# Patient Record
Sex: FEMALE | Race: WHITE | NOT HISPANIC OR LATINO | Employment: UNEMPLOYED | URBAN - METROPOLITAN AREA
[De-identification: names, ages, dates, MRNs, and addresses within clinical notes are randomized per-mention and may not be internally consistent; named-entity substitution may affect disease eponyms.]

---

## 2020-01-22 PROBLEM — K21.9 GERD (GASTROESOPHAGEAL REFLUX DISEASE): Status: ACTIVE | Noted: 2020-01-22

## 2020-01-22 PROBLEM — Z98.890 H/O MAJOR ABDOMINAL SURGERY: Status: ACTIVE | Noted: 2020-01-22

## 2020-01-22 PROBLEM — R11.0 NAUSEA: Status: ACTIVE | Noted: 2020-01-22

## 2022-09-15 DIAGNOSIS — M54.2 CHRONIC NECK PAIN: Primary | ICD-10-CM

## 2022-09-15 DIAGNOSIS — G89.29 CHRONIC NECK PAIN: Primary | ICD-10-CM

## 2023-03-31 DIAGNOSIS — J38.01 PARESIS OF RIGHT VOCAL CORD: Primary | ICD-10-CM

## 2023-07-20 ENCOUNTER — OFFICE VISIT (OUTPATIENT)
Dept: OTOLARYNGOLOGY | Facility: CLINIC | Age: 53
End: 2023-07-20
Payer: MEDICAID

## 2023-07-20 VITALS
WEIGHT: 160 LBS | TEMPERATURE: 98 F | DIASTOLIC BLOOD PRESSURE: 83 MMHG | BODY MASS INDEX: 30.21 KG/M2 | HEART RATE: 72 BPM | SYSTOLIC BLOOD PRESSURE: 123 MMHG | RESPIRATION RATE: 16 BRPM | HEIGHT: 61 IN

## 2023-07-20 DIAGNOSIS — H72.91 TYMPANIC MEMBRANE PERFORATION, RIGHT: ICD-10-CM

## 2023-07-20 DIAGNOSIS — R49.0 DYSPHONIA: ICD-10-CM

## 2023-07-20 DIAGNOSIS — J38.01 PARESIS OF RIGHT VOCAL CORD: Primary | ICD-10-CM

## 2023-07-20 PROCEDURE — 31575 DIAGNOSTIC LARYNGOSCOPY: CPT | Mod: PBBFAC | Performed by: OTOLARYNGOLOGY

## 2023-07-20 PROCEDURE — 99215 OFFICE O/P EST HI 40 MIN: CPT | Mod: PBBFAC | Performed by: OTOLARYNGOLOGY

## 2023-07-20 RX ORDER — BUDESONIDE AND FORMOTEROL FUMARATE DIHYDRATE 160; 4.5 UG/1; UG/1
2 AEROSOL RESPIRATORY (INHALATION) 2 TIMES DAILY
COMMUNITY
Start: 2023-06-21

## 2023-07-20 RX ORDER — CYCLOBENZAPRINE HCL 10 MG
10 TABLET ORAL EVERY 8 HOURS PRN
COMMUNITY
Start: 2023-07-12 | End: 2024-01-31 | Stop reason: SDUPTHER

## 2023-07-20 RX ORDER — ALBUTEROL SULFATE 90 UG/1
2 AEROSOL, METERED RESPIRATORY (INHALATION) EVERY 6 HOURS PRN
COMMUNITY
Start: 2023-06-21

## 2023-07-20 RX ORDER — DICLOFENAC SODIUM 75 MG/1
75 TABLET, DELAYED RELEASE ORAL 2 TIMES DAILY PRN
COMMUNITY
Start: 2023-07-07

## 2023-07-20 NOTE — PROGRESS NOTES
Patient Name: Mayte Toney   YOB: 1970     Chief Complaint: paralyzed vocal cord       History of Present Illness:  July 20, 2023:   52-year-old female with a history of smoking referred for evaluation of right true vocal cord paralysis.  Patient is status post ACDF/PCDF surgery in May 2021 which had been complicated by the development of hoarseness.  The patient was seen by Dr. Hellen Boggs on December 2, 2021 for evaluation of this.  At that time the patient had indicated that her voice was soft or with phonatory at that time she also felt like she needed to strain to talk and had difficulty obtaining a loud vocal volume.  Rigid videostroboscopy at that time showed paralyzed right true vocal cord but a slight height mismatch.  Left vocal cord was fully mobile.  Patient was referred for speech therapy which she undertook for 6 weeks but she did not notice any improvement in her voice.  She would not seen Dr. Boggs for follow-up.    The patient was referred here after she recently had unknown of on a bronchoscopy by her pulmonologist as part of her evaluation for shortness of breath.  He noticed the right true vocal cord paralysis and referred the patient here for further treatment and evaluation.  The patient does continue to have hoarseness where she will at times need to strain to talk and continues to have difficulty obtaining a loud vocal volume.  She does not have any coughing or choking with eating or swallowing.  In regards to her shortness of breath she notices this more when lying down at night.  Patient denies history of heartburn or indigestion.  She currently smokes 1/2 pack of cigarettes per day.  She had been a heavier smoker in the past.  She does continue to have neck and back problems and may need additional surgery.    Patient had also indicated that she does have a right tympanic membrane perforation which she sustained at the age of 3 when her brother had placed a Chalo pin  in her ear.  She had undergone what sounds to be a tympanoplasty with a skin graft.  Patient has noticed that the hearing in her right ear is diminished relative to the left.  She will experience periodic drainage from that ear he is not had any problems for the past several months.  She does try to keep the ear dry.  Left ear is without problems.  No vertigo.  She has not had any recent audiologic evaluations.    Past Medical History:  Past Medical History:   Diagnosis Date    Allergies     Anxiety     Arthritis     Bowel obstruction     Depression     Hypertension      Past Surgical History:   Procedure Laterality Date    ABDOMINAL SURGERY      adnoids      APPENDECTOMY       SECTION      GALLBLADDER SURGERY      HERNIA REPAIR      HYSTERECTOMY      SKIN GRAFT Right     ear    SPINE SURGERY      TONSILLECTOMY         Review of Systems:  Unremarkable except as mentioned above.    Current Medications:  Current Outpatient Medications   Medication Sig    albuterol (PROVENTIL/VENTOLIN HFA) 90 mcg/actuation inhaler 2 puffs every 6 (six) hours as needed.    ARIPiprazole (ABILIFY) 10 MG Tab Take 10 mg by mouth once daily.    baclofen (LIORESAL) 20 MG tablet Take 20 mg by mouth 3 (three) times daily.    clonazePAM (KLONOPIN) 1 MG tablet Take 1 mg by mouth 3 (three) times daily.    cyclobenzaprine (FLEXERIL) 10 MG tablet Take 10 mg by mouth every 8 (eight) hours as needed.    dextroamphetamine-amphetamine (ADDERALL XR) 30 MG 24 hr capsule Take 30 mg by mouth 2 (two) times daily.    diclofenac (VOLTAREN) 75 MG EC tablet Take 75 mg by mouth 2 (two) times daily as needed.    docusate sodium (COLACE) 100 MG capsule Take 100 mg by mouth 2 (two) times daily.    estrogens, conjugated, (PREMARIN) 0.625 MG tablet Take 0.625 mg by mouth once daily.    gabapentin (NEURONTIN) 300 MG capsule Take 300 mg by mouth every 8 (eight) hours.    levETIRAcetam (KEPPRA) 500 MG Tab Take 500 mg by mouth 2 (two) times daily.     "levocetirizine (XYZAL) 5 MG tablet Take 5 mg by mouth every evening.    promethazine (PHENERGAN) 25 MG tablet Take 25 mg by mouth every 6 (six) hours as needed for Nausea.    SYMBICORT 160-4.5 mcg/actuation HFAA 2 puffs 2 (two) times daily.    venlafaxine (EFFEXOR) 75 MG tablet Take 75 mg by mouth 2 (two) times daily.    naproxen (NAPROSYN) 500 MG tablet Take 500 mg by mouth 2 (two) times daily.    OLANZapine (ZYPREXA) 5 MG tablet Take 5 mg by mouth every evening.    pantoprazole (PROTONIX) 20 MG tablet Take 1 tablet (20 mg total) by mouth once daily.    polyethylene glycol (GLYCOLAX) 17 gram/dose powder Take 17 g by mouth once daily. (Patient not taking: Reported on 7/20/2023)     No current facility-administered medications for this visit.        Allergies:  Review of patient's allergies indicates:   Allergen Reactions    Depakene [valproic acid]     Toradol [ketorolac]     Tramadol     Zofran [ondansetron hcl]     Lyrica [pregabalin] Nausea And Vomiting     Bad headaches        Physical Exam:  Vital signs:   Vitals:    07/20/23 0826   BP: 123/83   BP Location: Right arm   Patient Position: Sitting   BP Method: Medium (Automatic)   Pulse: 72   Resp: 16   Temp: 97.8 °F (36.6 °C)   TempSrc: Oral   Weight: 72.6 kg (160 lb)   Height: 5' 1" (1.549 m)   General:  Well-developed well-nourished female in no acute distress.  Voice is slightly hoarse and she will have periodic pitch breaks.  No stridor.    Head and face:  Normocephalic.  No facial lesions.  No temporomandibular joint tenderness or click.  Ears: Right ear-auricle is normally developed.  External auditory canal is clear.  Tympanic membrane is nonerythematous.  She does have a 25% marginal perforation involving the junction of the anterior inferior and posterior inferior quadrant of the pars tensa.  No squamous debris.  No granulation.  No mucosal edema or drainage. Left ear-auricle is normally developed.  External auditory canal is clear.  Tympanic membrane " is nonerythematous.   Nose:  Nasal dorsum unremarkable.  Mild congestion with clear drainage.    Neck:  Supple without adenopathy or thyromegaly.  Trachea is in the midline.  Parotid and submandibular glands are normal to palpation without masses or tenderness.  Eyes:  Extraocular muscles intact.  No nystagmus.  No exophthalmos or enophthalmos.  Neurologic:  Alert and oriented.  Cranial nerves 2-12 are grossly normal.    Procedure note: Flexible laryngoscopy.  The nose was prepped with 1% phenyleprine nasal spray and tetracaine topically. The flexible fiberoptic laryngoscope was introduced into the left nostril and advanced. Examination of the nose showed the above mentioned findings. Examination of the nasopharynx showed no nasopharyngeal masses or eustachian tube obstruction. Examination of the base of tongue showed mild lingual tonsil hypertrophy.  No masses or exudate involving the base of tongue. Laryngeal examination showed right true vocal cord is immobile and in a midline position.  Left true vocal cord has normal mobility.  There is near complete glottic closure upon phonation.  She does have a slight posterior glottic chink.  No other laryngeal lesions or masses. Examination of the hypopharynx showed no masses or lesions.  No pooling of secretions in the piriform sinuses.  She does have some mild posterior arytenoid edema.    Assessment/Plan:  51-year-old female with right true vocal cord paresis initially diagnosed on December 2, 2021, in a patient status post ACDF/PCD F surgery in May of 2021.  Patient continues to have some problem with hoarseness and vocal difficulties.  By history she does not have any associated dysphagia.  She had been treated with speech therapy previously.    Right tympanic membrane perforation.      Plan:  We will refer the patient back to Dr. Hellen Boggs for further evaluation for additional treatment of her right vocal cord paralysis.  I did discuss with her possibility of a  medialization procedure and that sometimes a temporary procedure may be done initially to see if she would experience benefit.  She does understand this.    In regards to her tympanic membrane perforation.  She will be scheduled for an audiogram with follow-up afterwards.  She is to keep the ear dry.      Shashi Montano M.D.

## 2023-10-18 ENCOUNTER — CLINICAL SUPPORT (OUTPATIENT)
Dept: AUDIOLOGY | Facility: HOSPITAL | Age: 53
End: 2023-10-18
Payer: MEDICAID

## 2023-10-18 DIAGNOSIS — H90.A31 MIXED CONDUCTIVE AND SENSORINEURAL HEARING LOSS OF RIGHT EAR WITH RESTRICTED HEARING OF LEFT EAR: Primary | ICD-10-CM

## 2023-10-18 PROCEDURE — 92567 TYMPANOMETRY: CPT | Performed by: AUDIOLOGIST-HEARING AID FITTER

## 2023-10-18 PROCEDURE — 92557 COMPREHENSIVE HEARING TEST: CPT | Performed by: AUDIOLOGIST-HEARING AID FITTER

## 2023-10-18 NOTE — PROGRESS NOTES
Audiological Evaluation    Patient History:    Patient evaluated today to assess hearing.  She reportedly perceives significant difficulties with hearing of the right ear only due to a history of tympanic membrane perforation in childhood and otologic procedure (T-plasty).  Tinnitus, otalgia, otorrhea and dizziness have been denied at this time.  Patient's medical history has reportedly not changed since most recent medical evaluation.       Pure Tone Testing:    Right ear:       Moderately-severe, mixed HL     Left ear:          Normal to moderate, SNHL        Tympanometry:      Right ear:    CNT due to presence of TM perforation    Left ear: Type 'A' tympanogram           Acoustic Reflex Testing    Right ear:   Did not test     Left ear: Did not test        Interpretations:    Pure tone testing revealed a moderately-severe, mixed hearing loss for the right ear.  Testing for the left ear revealed normal to moderate, sensorineural hearing loss. Speech reception thresholds were obtained at 55 dB HL (right ear) and 20 dB HL (left ear) consistent with pure tone results, bilaterally.  Word recognition scores were excellent, bilaterally.  Immittance testing revealed a Type A tympanogram for the left ear indicative of normal middle ear function; a seal could not be maintained for the left ear due to the presence of the perforation. Otoscopy revealed clear EACs, bilaterally.      Recommendations:     Audiological testing annually and/or per ENT  ENT evaluation per ENT (11/14/2023)  Hearing protection when exposed to hazardous noise    June Pappas  Clinical Audiologist

## 2023-11-14 ENCOUNTER — OFFICE VISIT (OUTPATIENT)
Dept: OTOLARYNGOLOGY | Facility: CLINIC | Age: 53
End: 2023-11-14
Payer: MEDICAID

## 2023-11-14 VITALS
HEIGHT: 61 IN | BODY MASS INDEX: 25.11 KG/M2 | DIASTOLIC BLOOD PRESSURE: 89 MMHG | HEART RATE: 79 BPM | TEMPERATURE: 98 F | SYSTOLIC BLOOD PRESSURE: 137 MMHG | RESPIRATION RATE: 16 BRPM | WEIGHT: 133 LBS

## 2023-11-14 DIAGNOSIS — H90.A22 SENSORINEURAL HEARING LOSS (SNHL) OF LEFT EAR WITH RESTRICTED HEARING OF RIGHT EAR: ICD-10-CM

## 2023-11-14 DIAGNOSIS — H72.91 TYMPANIC MEMBRANE PERFORATION, RIGHT: Primary | ICD-10-CM

## 2023-11-14 DIAGNOSIS — H90.A31 MIXED CONDUCTIVE AND SENSORINEURAL HEARING LOSS OF RIGHT EAR WITH RESTRICTED HEARING OF LEFT EAR: ICD-10-CM

## 2023-11-14 PROBLEM — K56.609 BOWEL OBSTRUCTION: Status: ACTIVE | Noted: 2023-11-14

## 2023-11-14 PROCEDURE — 99215 OFFICE O/P EST HI 40 MIN: CPT | Mod: PBBFAC | Performed by: OTOLARYNGOLOGY

## 2023-11-14 RX ORDER — CEFAZOLIN SODIUM 2 G/50ML
2 SOLUTION INTRAVENOUS
Status: CANCELLED | OUTPATIENT
Start: 2023-11-14

## 2023-11-14 NOTE — H&P (VIEW-ONLY)
Patient Name: Mayte Toney   YOB: 1970     Chief Complaint: paralyzed vocal cord       History of Present Illness:  July 20, 2023:   52-year-old female with a history of smoking referred for evaluation of right true vocal cord paralysis.  Patient is status post ACDF/PCDF surgery in May 2021 which had been complicated by the development of hoarseness.  The patient was seen by Dr. Hellen Boggs on December 2, 2021 for evaluation of this.  At that time the patient had indicated that her voice was soft or with phonatory at that time she also felt like she needed to strain to talk and had difficulty obtaining a loud vocal volume.  Rigid videostroboscopy at that time showed paralyzed right true vocal cord but a slight height mismatch.  Left vocal cord was fully mobile.  Patient was referred for speech therapy which she undertook for 6 weeks but she did not notice any improvement in her voice.  She would not seen Dr. Boggs for follow-up.    The patient was referred here after she recently had unknown of on a bronchoscopy by her pulmonologist as part of her evaluation for shortness of breath.  He noticed the right true vocal cord paralysis and referred the patient here for further treatment and evaluation.  The patient does continue to have hoarseness where she will at times need to strain to talk and continues to have difficulty obtaining a loud vocal volume.  She does not have any coughing or choking with eating or swallowing.  In regards to her shortness of breath she notices this more when lying down at night.  Patient denies history of heartburn or indigestion.  She currently smokes 1/2 pack of cigarettes per day.  She had been a heavier smoker in the past.  She does continue to have neck and back problems and may need additional surgery.    Patient had also indicated that she does have a right tympanic membrane perforation which she sustained at the age of 3 when her brother had placed a Chalo pin  in her ear.  She had undergone what sounds to be a tympanoplasty with a skin graft.  Patient has noticed that the hearing in her right ear is diminished relative to the left.  She will experience periodic drainage from that ear he is not had any problems for the past several months.  She does try to keep the ear dry.  Left ear is without problems.  No vertigo.  She has not had any recent audiologic evaluations.    November 14, 2023:   Patient presents today for follow-up of her right tympanic membrane perforation in review of her audiogram done on October 18, 2023.  Patient has not had any problems with any pain or drainage since her last appointment.  She is keeping water out of the ear.  The audiogram showed a moderate least severe mixed hearing loss in the right ear with an air bone gap of 20 to 50 decibel air bone gap with a speech reception threshold of 55 decibels and 100% discrimination.  In the left ear she had a normal to moderate sensorineural hearing loss with speech reception threshold of 20 decibels and 100% discrimination and a type a tympanogram.  Results were discussed with the patient.  In regards to her paralysis the patient had seen Dr. Pérez in the patient did require an urgent tracheostomy on a August 25, 2023, when she was found to have a new left true vocal cord paresis and subglottic mucus plug resulting in respiratory distress and altered mental status necessitating eventual intubation.  Patient does have follow-up with Dr. Pérez is coming spring.  Patient is tolerating regular diet at this time.  Of note the patient does continue to smoke but this is not on a daily basis.    Past Medical History:  Past Medical History:   Diagnosis Date    Allergies     Anxiety     Arthritis     Bowel obstruction     Status post exploratory laparotomy with partial colectomy for treatment; no cancer was present.  No colostomy was needed.    Depression     Hypertension      Past Surgical History:    Procedure Laterality Date    ABDOMINAL SURGERY      Status post exploratory laparotomy with partial colon resection for bowel obstruction; no cancer present and no colostomy needed.    APPENDECTOMY       SECTION      GALLBLADDER SURGERY      HIATAL HERNIA REPAIR      HYSTERECTOMY      SPINE SURGERY      TONSILLECTOMY AND ADENOIDECTOMY      TRACHEOSTOMY  2023    TYMPANOPLASTY         Review of Systems:  Unremarkable except as mentioned above.    Current Medications:  Current Outpatient Medications   Medication Sig    albuterol (PROVENTIL/VENTOLIN HFA) 90 mcg/actuation inhaler 2 puffs every 6 (six) hours as needed.    ARIPiprazole (ABILIFY) 10 MG Tab Take 10 mg by mouth once daily.    baclofen (LIORESAL) 20 MG tablet Take 20 mg by mouth 3 (three) times daily.    clonazePAM (KLONOPIN) 1 MG tablet Take 1 mg by mouth 3 (three) times daily.    cyclobenzaprine (FLEXERIL) 10 MG tablet Take 10 mg by mouth every 8 (eight) hours as needed.    dextroamphetamine-amphetamine (ADDERALL XR) 30 MG 24 hr capsule Take 30 mg by mouth 2 (two) times daily.    diclofenac (VOLTAREN) 75 MG EC tablet Take 75 mg by mouth 2 (two) times daily as needed.    gabapentin (NEURONTIN) 300 MG capsule Take 300 mg by mouth every 8 (eight) hours.    levocetirizine (XYZAL) 5 MG tablet Take 5 mg by mouth every evening.    naproxen (NAPROSYN) 500 MG tablet Take 500 mg by mouth 2 (two) times daily.    OLANZapine (ZYPREXA) 5 MG tablet Take 5 mg by mouth every evening.    SYMBICORT 160-4.5 mcg/actuation HFAA 2 puffs 2 (two) times daily.    venlafaxine (EFFEXOR) 75 MG tablet Take 75 mg by mouth 2 (two) times daily.    docusate sodium (COLACE) 100 MG capsule Take 100 mg by mouth 2 (two) times daily.    estrogens, conjugated, (PREMARIN) 0.625 MG tablet Take 0.625 mg by mouth once daily.    levETIRAcetam (KEPPRA) 500 MG Tab Take 500 mg by mouth 2 (two) times daily.    pantoprazole (PROTONIX) 20 MG tablet Take 1 tablet (20 mg total) by mouth once  "daily.    polyethylene glycol (GLYCOLAX) 17 gram/dose powder Take 17 g by mouth once daily. (Patient not taking: Reported on 7/20/2023)    promethazine (PHENERGAN) 25 MG tablet Take 25 mg by mouth every 6 (six) hours as needed for Nausea.     No current facility-administered medications for this visit.        Allergies:  Review of patient's allergies indicates:   Allergen Reactions    Depakene [valproic acid]     Toradol [ketorolac]     Tramadol     Zofran [ondansetron hcl]     Lyrica [pregabalin] Nausea And Vomiting     Bad headaches        Physical Exam:  Vital signs:   Vitals:    11/14/23 0835   BP: 137/89   BP Location: Right arm   Patient Position: Sitting   BP Method: Medium (Automatic)   Pulse: 79   Resp: 16   Temp: 98.4 °F (36.9 °C)   TempSrc: Oral   Weight: 60.3 kg (133 lb)   Height: 5' 1" (1.549 m)   General:  Well-developed well-nourished female in no acute distress.  Voice is slightly hoarse and she will have periodic pitch breaks.  No stridor.    Head and face:  Normocephalic.  No facial lesions.  No temporomandibular joint tenderness or click.  Ears: Right ear-auricle is normally developed.  External auditory canal is clear.  Tympanic membrane is nonerythematous.  She does have a 30-40% marginal perforation involving the junction of the anterior inferior and posterior inferior quadrant of the pars tensa.  No squamous debris.  No granulation.  No mucosal edema or drainage. Left ear-auricle is normally developed.  External auditory canal is clear.  Tympanic membrane is nonerythematous.   Nose:  Nasal dorsum unremarkable.  Mild congestion with clear drainage.    Neck:  Supple without adenopathy or thyromegaly.  Trachea is in the midline.  Parotid and submandibular glands are normal to palpation without masses or tenderness.  Eyes:  Extraocular muscles intact.  No nystagmus.  No exophthalmos or enophthalmos.  Neurologic:  Alert and oriented.  Cranial nerves 2-12 are grossly " normal.    Audiogram:        Assessment/Plan:  53-year-old female with longstanding right tympanic membrane perforation associated with a mixed hearing loss with a 20-50 decibel air bone gap without history of recurrent otorrhea.    Plan:  Treatment options for the tympanic membrane perforation were discussed.  This includes no intervention with continued water precautions versus no surgical intervention with hearing aid for hearing rehabilitation versus tympanoplasty with the aim of closing the tympanic membrane perforation and improving her hearing.  Patient would like to proceed with surgery and this is scheduled for November 20th 2023.  Will attempt to obtain a CT scan preoperatively but spoke with the patient and told her that if this could not be done that would not preclude her from having surgery.  Risks of the procedure including bleeding, infection, graft failure, persistence of her hearing loss or possible worsening, tinnitus, dizziness, facial weakness, and altered taste were also discussed and she does understand the risk.      Shashi Montano M.D.

## 2023-11-19 ENCOUNTER — ANESTHESIA EVENT (OUTPATIENT)
Dept: SURGERY | Facility: HOSPITAL | Age: 53
End: 2023-11-19
Payer: MEDICAID

## 2023-11-20 ENCOUNTER — HOSPITAL ENCOUNTER (OUTPATIENT)
Facility: HOSPITAL | Age: 53
Discharge: HOME | End: 2023-11-20
Attending: OTOLARYNGOLOGY | Admitting: OTOLARYNGOLOGY
Payer: MEDICAID

## 2023-11-20 ENCOUNTER — ANESTHESIA (OUTPATIENT)
Dept: SURGERY | Facility: HOSPITAL | Age: 53
End: 2023-11-20
Payer: MEDICAID

## 2023-11-20 VITALS
HEART RATE: 71 BPM | RESPIRATION RATE: 20 BRPM | OXYGEN SATURATION: 98 % | WEIGHT: 127.63 LBS | BODY MASS INDEX: 24.1 KG/M2 | HEIGHT: 61 IN | DIASTOLIC BLOOD PRESSURE: 90 MMHG | TEMPERATURE: 98 F | SYSTOLIC BLOOD PRESSURE: 126 MMHG

## 2023-11-20 DIAGNOSIS — Z01.818 PRE-OP EVALUATION: ICD-10-CM

## 2023-11-20 DIAGNOSIS — H90.A31 MIXED CONDUCTIVE AND SENSORINEURAL HEARING LOSS OF RIGHT EAR WITH RESTRICTED HEARING OF LEFT EAR: Primary | ICD-10-CM

## 2023-11-20 DIAGNOSIS — H72.91 TYMPANIC MEMBRANE PERFORATION, RIGHT: ICD-10-CM

## 2023-11-20 LAB
BASOPHILS # BLD AUTO: 0.07 X10(3)/MCL
BASOPHILS NFR BLD AUTO: 0.6 %
EOSINOPHIL # BLD AUTO: 0.5 X10(3)/MCL (ref 0–0.9)
EOSINOPHIL NFR BLD AUTO: 4.6 %
ERYTHROCYTE [DISTWIDTH] IN BLOOD BY AUTOMATED COUNT: 13.2 % (ref 11.5–17)
HCT VFR BLD AUTO: 39.7 % (ref 37–47)
HGB BLD-MCNC: 12.3 G/DL (ref 12–16)
IMM GRANULOCYTES # BLD AUTO: 0.06 X10(3)/MCL (ref 0–0.04)
IMM GRANULOCYTES NFR BLD AUTO: 0.6 %
LYMPHOCYTES # BLD AUTO: 4.24 X10(3)/MCL (ref 0.6–4.6)
LYMPHOCYTES NFR BLD AUTO: 39.2 %
MCH RBC QN AUTO: 27.8 PG (ref 27–31)
MCHC RBC AUTO-ENTMCNC: 31 G/DL (ref 33–36)
MCV RBC AUTO: 89.8 FL (ref 80–94)
MONOCYTES # BLD AUTO: 0.87 X10(3)/MCL (ref 0.1–1.3)
MONOCYTES NFR BLD AUTO: 8 %
NEUTROPHILS # BLD AUTO: 5.07 X10(3)/MCL (ref 2.1–9.2)
NEUTROPHILS NFR BLD AUTO: 47 %
NRBC BLD AUTO-RTO: 0 %
PLATELET # BLD AUTO: 357 X10(3)/MCL (ref 130–400)
PMV BLD AUTO: 8.8 FL (ref 7.4–10.4)
RBC # BLD AUTO: 4.42 X10(6)/MCL (ref 4.2–5.4)
WBC # SPEC AUTO: 10.81 X10(3)/MCL (ref 4.5–11.5)

## 2023-11-20 PROCEDURE — 63600175 PHARM REV CODE 636 W HCPCS: Performed by: ANESTHESIOLOGY

## 2023-11-20 PROCEDURE — 88304 TISSUE EXAM BY PATHOLOGIST: CPT | Mod: TC | Performed by: OTOLARYNGOLOGY

## 2023-11-20 PROCEDURE — 25000003 PHARM REV CODE 250

## 2023-11-20 PROCEDURE — D9220A PRA ANESTHESIA: Mod: ANES,,, | Performed by: ANESTHESIOLOGY

## 2023-11-20 PROCEDURE — D9220A PRA ANESTHESIA: ICD-10-PCS | Mod: CRNA,,, | Performed by: NURSE ANESTHETIST, CERTIFIED REGISTERED

## 2023-11-20 PROCEDURE — D9220A PRA ANESTHESIA: ICD-10-PCS | Mod: ANES,,, | Performed by: ANESTHESIOLOGY

## 2023-11-20 PROCEDURE — 25000003 PHARM REV CODE 250: Performed by: OTOLARYNGOLOGY

## 2023-11-20 PROCEDURE — 63600175 PHARM REV CODE 636 W HCPCS: Performed by: NURSE ANESTHETIST, CERTIFIED REGISTERED

## 2023-11-20 PROCEDURE — 85025 COMPLETE CBC W/AUTO DIFF WBC: CPT | Performed by: OTOLARYNGOLOGY

## 2023-11-20 PROCEDURE — 36000709 HC OR TIME LEV III EA ADD 15 MIN: Performed by: OTOLARYNGOLOGY

## 2023-11-20 PROCEDURE — 71000015 HC POSTOP RECOV 1ST HR: Performed by: OTOLARYNGOLOGY

## 2023-11-20 PROCEDURE — 36000708 HC OR TIME LEV III 1ST 15 MIN: Performed by: OTOLARYNGOLOGY

## 2023-11-20 PROCEDURE — 37000008 HC ANESTHESIA 1ST 15 MINUTES: Performed by: OTOLARYNGOLOGY

## 2023-11-20 PROCEDURE — 25000003 PHARM REV CODE 250: Performed by: NURSE ANESTHETIST, CERTIFIED REGISTERED

## 2023-11-20 PROCEDURE — 27201423 OPTIME MED/SURG SUP & DEVICES STERILE SUPPLY: Performed by: OTOLARYNGOLOGY

## 2023-11-20 PROCEDURE — 71000016 HC POSTOP RECOV ADDL HR: Performed by: OTOLARYNGOLOGY

## 2023-11-20 PROCEDURE — 93005 ELECTROCARDIOGRAM TRACING: CPT | Mod: 59

## 2023-11-20 PROCEDURE — 71000033 HC RECOVERY, INTIAL HOUR: Performed by: OTOLARYNGOLOGY

## 2023-11-20 PROCEDURE — 63600175 PHARM REV CODE 636 W HCPCS: Performed by: OTOLARYNGOLOGY

## 2023-11-20 PROCEDURE — D9220A PRA ANESTHESIA: Mod: CRNA,,, | Performed by: NURSE ANESTHETIST, CERTIFIED REGISTERED

## 2023-11-20 PROCEDURE — 37000009 HC ANESTHESIA EA ADD 15 MINS: Performed by: OTOLARYNGOLOGY

## 2023-11-20 RX ORDER — MORPHINE SULFATE 2 MG/ML
2 INJECTION, SOLUTION INTRAMUSCULAR; INTRAVENOUS EVERY 5 MIN PRN
Status: DISCONTINUED | OUTPATIENT
Start: 2023-11-20 | End: 2023-11-20 | Stop reason: HOSPADM

## 2023-11-20 RX ORDER — OFLOXACIN 3 MG/ML
SOLUTION/ DROPS OPHTHALMIC
Status: DISCONTINUED | OUTPATIENT
Start: 2023-11-20 | End: 2023-11-20 | Stop reason: HOSPADM

## 2023-11-20 RX ORDER — MIDAZOLAM HYDROCHLORIDE 1 MG/ML
2 INJECTION INTRAMUSCULAR; INTRAVENOUS ONCE AS NEEDED
Status: COMPLETED | OUTPATIENT
Start: 2023-11-20 | End: 2023-11-20

## 2023-11-20 RX ORDER — LIDOCAINE HYDROCHLORIDE 20 MG/ML
INJECTION INTRAVENOUS
Status: DISCONTINUED | OUTPATIENT
Start: 2023-11-20 | End: 2023-11-20

## 2023-11-20 RX ORDER — MUPIROCIN 20 MG/G
OINTMENT TOPICAL
Status: DISCONTINUED | OUTPATIENT
Start: 2023-11-20 | End: 2023-11-20 | Stop reason: HOSPADM

## 2023-11-20 RX ORDER — PROPOFOL 10 MG/ML
VIAL (ML) INTRAVENOUS
Status: DISCONTINUED | OUTPATIENT
Start: 2023-11-20 | End: 2023-11-20

## 2023-11-20 RX ORDER — MEPERIDINE HYDROCHLORIDE 25 MG/ML
12.5 INJECTION INTRAMUSCULAR; INTRAVENOUS; SUBCUTANEOUS EVERY 10 MIN PRN
Status: DISCONTINUED | OUTPATIENT
Start: 2023-11-20 | End: 2023-11-20 | Stop reason: HOSPADM

## 2023-11-20 RX ORDER — DEXAMETHASONE SODIUM PHOSPHATE 4 MG/ML
INJECTION, SOLUTION INTRA-ARTICULAR; INTRALESIONAL; INTRAMUSCULAR; INTRAVENOUS; SOFT TISSUE
Status: DISCONTINUED | OUTPATIENT
Start: 2023-11-20 | End: 2023-11-20

## 2023-11-20 RX ORDER — REMIFENTANIL HYDROCHLORIDE 1 MG/ML
INJECTION, POWDER, LYOPHILIZED, FOR SOLUTION INTRAVENOUS CONTINUOUS PRN
Status: DISCONTINUED | OUTPATIENT
Start: 2023-11-20 | End: 2023-11-20

## 2023-11-20 RX ORDER — SODIUM CHLORIDE, SODIUM LACTATE, POTASSIUM CHLORIDE, CALCIUM CHLORIDE 600; 310; 30; 20 MG/100ML; MG/100ML; MG/100ML; MG/100ML
INJECTION, SOLUTION INTRAVENOUS CONTINUOUS
Status: DISCONTINUED | OUTPATIENT
Start: 2023-11-20 | End: 2023-11-20 | Stop reason: HOSPADM

## 2023-11-20 RX ORDER — DROPERIDOL 2.5 MG/ML
INJECTION, SOLUTION INTRAMUSCULAR; INTRAVENOUS
Status: DISCONTINUED | OUTPATIENT
Start: 2023-11-20 | End: 2023-11-20

## 2023-11-20 RX ORDER — DEXMEDETOMIDINE HYDROCHLORIDE 100 UG/ML
INJECTION, SOLUTION INTRAVENOUS
Status: DISCONTINUED | OUTPATIENT
Start: 2023-11-20 | End: 2023-11-20

## 2023-11-20 RX ORDER — EPINEPHRINE 1 MG/ML
INJECTION INTRAMUSCULAR; INTRAVENOUS; SUBCUTANEOUS
Status: DISCONTINUED | OUTPATIENT
Start: 2023-11-20 | End: 2023-11-20 | Stop reason: HOSPADM

## 2023-11-20 RX ORDER — EPINEPHRINE 1 MG/ML
INJECTION, SOLUTION, CONCENTRATE INTRAVENOUS
Status: DISCONTINUED | OUTPATIENT
Start: 2023-11-20 | End: 2023-11-20 | Stop reason: HOSPADM

## 2023-11-20 RX ORDER — FENTANYL CITRATE 50 UG/ML
INJECTION, SOLUTION INTRAMUSCULAR; INTRAVENOUS
Status: DISCONTINUED | OUTPATIENT
Start: 2023-11-20 | End: 2023-11-20

## 2023-11-20 RX ORDER — CEFAZOLIN SODIUM 1 G/3ML
2 INJECTION, POWDER, FOR SOLUTION INTRAMUSCULAR; INTRAVENOUS
Status: COMPLETED | OUTPATIENT
Start: 2023-11-20 | End: 2023-11-20

## 2023-11-20 RX ORDER — SODIUM CHLORIDE 0.9 % (FLUSH) 0.9 %
10 SYRINGE (ML) INJECTION
Status: DISCONTINUED | OUTPATIENT
Start: 2023-11-20 | End: 2023-11-20 | Stop reason: HOSPADM

## 2023-11-20 RX ORDER — HYDROCODONE BITARTRATE AND ACETAMINOPHEN 5; 325 MG/1; MG/1
1 TABLET ORAL EVERY 6 HOURS PRN
Qty: 12 TABLET | Refills: 0 | Status: SHIPPED | OUTPATIENT
Start: 2023-11-20 | End: 2023-11-27

## 2023-11-20 RX ORDER — LIDOCAINE HYDROCHLORIDE 10 MG/ML
INJECTION INFILTRATION; PERINEURAL
Status: DISCONTINUED | OUTPATIENT
Start: 2023-11-20 | End: 2023-11-20 | Stop reason: HOSPADM

## 2023-11-20 RX ORDER — OXYCODONE HYDROCHLORIDE 5 MG/1
5 TABLET ORAL
Status: DISCONTINUED | OUTPATIENT
Start: 2023-11-20 | End: 2023-11-20 | Stop reason: HOSPADM

## 2023-11-20 RX ADMIN — SODIUM CHLORIDE, POTASSIUM CHLORIDE, SODIUM LACTATE AND CALCIUM CHLORIDE: 600; 310; 30; 20 INJECTION, SOLUTION INTRAVENOUS at 06:11

## 2023-11-20 RX ADMIN — MIDAZOLAM HYDROCHLORIDE 2 MG: 1 INJECTION, SOLUTION INTRAMUSCULAR; INTRAVENOUS at 06:11

## 2023-11-20 RX ADMIN — DEXMEDETOMIDINE 20 MCG: 200 INJECTION, SOLUTION INTRAVENOUS at 09:11

## 2023-11-20 RX ADMIN — PROPOFOL 150 MG: 10 INJECTION, EMULSION INTRAVENOUS at 07:11

## 2023-11-20 RX ADMIN — LIDOCAINE HYDROCHLORIDE 50 MG: 20 INJECTION INTRAVENOUS at 07:11

## 2023-11-20 RX ADMIN — DROPERIDOL 0.62 MG: 2.5 INJECTION, SOLUTION INTRAMUSCULAR; INTRAVENOUS at 07:11

## 2023-11-20 RX ADMIN — REMIFENTANIL HYDROCHLORIDE 0.05 MCG/KG/MIN: 1 INJECTION, POWDER, LYOPHILIZED, FOR SOLUTION INTRAVENOUS at 07:11

## 2023-11-20 RX ADMIN — FENTANYL CITRATE 50 MCG: 50 INJECTION INTRAMUSCULAR; INTRAVENOUS at 09:11

## 2023-11-20 RX ADMIN — CEFAZOLIN 2 G: 330 INJECTION, POWDER, FOR SOLUTION INTRAMUSCULAR; INTRAVENOUS at 07:11

## 2023-11-20 RX ADMIN — DEXAMETHASONE SODIUM PHOSPHATE 8 MG: 4 INJECTION, SOLUTION INTRA-ARTICULAR; INTRALESIONAL; INTRAMUSCULAR; INTRAVENOUS; SOFT TISSUE at 07:11

## 2023-11-20 RX ADMIN — FENTANYL CITRATE 50 MCG: 50 INJECTION INTRAMUSCULAR; INTRAVENOUS at 07:11

## 2023-11-20 NOTE — DISCHARGE INSTRUCTIONS
"POST OPERATIVE INSTRUCTIONS:     Activity  No heavy lifting, strenuous activity, contact sports for 2 weeks. Refrain from bending forward to reach something on the floor  DO NOT blow your nose until such time as your doctor has indicated your ear is healed. Any accumulated secretions in the nose may be drawn back into the throat and cough/spit up. You may use saline spray in the nose if needed  DO NOT "pop" your ears by holding your nose and blowing air through the Eustachian tube into the ear  Sneeze with your mouth open    Keep left ear dry at all times. When showering, use cotton ball with vaseline on it to keep the ear dry    Medication  Take tylenol 650mg q6 hours as needed for pain/fever and ibuprofen 600mg q6 hours as needed for pain/fever.    Hearing  Rarely is hearing improvement noted immediately after surgery. It may even worsen temporarily due to swelling and packing in the ear. Maximum improvement takes 4-6 months.  Occasional popping sensation, fullness, or even sharp pains can be expected for several weeks after surgery  Minor degrees of dizziness may be present on head motion and need not concern you unless this should increase. Avoid sudden movements.   "

## 2023-11-20 NOTE — TRANSFER OF CARE
"Anesthesia Transfer of Care Note    Patient: Mayte Toney    Procedure(s) Performed: Procedure(s) (LRB):  TYMPANOPLASTY (Right)    Patient location: PACU    Anesthesia Type: general    Transport from OR: Transported from OR on room air with adequate spontaneous ventilation    Post pain: adequate analgesia    Post assessment: no apparent anesthetic complications and tolerated procedure well    Post vital signs: stable    Level of consciousness: awake    Nausea/Vomiting: no nausea/vomiting    Complications: none    Transfer of care protocol was followedComments: Report to Daily RN      Last vitals: Visit Vitals  BP (!) 131/96   Pulse 80   Temp 36.3 °C (97.4 °F) (Axillary)   Resp 20   Ht 5' 0.98" (1.549 m)   Wt 57.9 kg (127 lb 9.6 oz)   SpO2 100%   Breastfeeding No   BMI 24.12 kg/m²     "

## 2023-11-20 NOTE — ANESTHESIA PROCEDURE NOTES
Intubation    Date/Time: 11/20/2023 7:11 AM    Performed by: Ruperto Henderson CRNA  Authorized by: Ashli Wiley MD    Intubation:     Induction:  Inhalational - ETT/trach    Intubated:  Arrived to OR intubated    Mask Ventilation:  Not attempted    Attempts:  1    Attempted By:  CRNA    Difficult Airway Encountered?: No      Complications:  None    Airway Device:  Other (see comments) and coil wire tube    Airway Device Size:  7.0    Style/Cuff Inflation:  Cuffed (inflated to minimal occlusive pressure)    Inflation Amount (mL):  8    Secured at:  The skin level of trach    Placement Verified By:  Capnometry    Complicating Factors:  None    Findings Post-Intubation:  BS equal bilateral  Notes:      Arrived in OR with existing Insitu Tracheostomy

## 2023-11-20 NOTE — ANESTHESIA POSTPROCEDURE EVALUATION
Anesthesia Post Evaluation    Patient: Mayte Toney    Procedure(s) Performed: Procedure(s) (LRB):  TYMPANOPLASTY (Right)    Final Anesthesia Type: general      Patient location during evaluation: DOSC  Post-procedure vital signs: reviewed and stable  Airway patency: patent      Anesthetic complications: no      Cardiovascular status: hemodynamically stable  Respiratory status: spontaneous ventilation  Follow-up not needed.          Vitals Value Taken Time   /90 11/20/23 1246   Temp 36.3 °C (97.4 °F) 11/20/23 0943   Pulse 80 11/20/23 1246   Resp 20 11/20/23 1000   SpO2 98 % 11/20/23 1246   Vitals shown include unvalidated device data.      Event Time   Out of Recovery 10:12:00         Pain/Kade Score: Kade Score: 10 (11/20/2023 10:06 AM)

## 2023-11-20 NOTE — OP NOTE
Otolaryngology Operative Note    Date of procedure: 11/20/2023    Attending Surgeon: Shashi Montano MD    Resident Surgeon: Stephanie Sultana PGY V    Pre-operative diagnosis:  1. Right tympanic membrane perforation  2. Right MHL        Post-operative diagnosis:   1. Same, plus  3. Cholesteatoma, right middle ear    Procedure:  1. Endoscopic right tympanoplasty  2. Right tragal cartilage graft    Anesthesia: General endotracheal    Complications: None    Specimens:   ID Type Source Tests Collected by Time Destination   A : right middle ear contents Tissue Ear Middle, Right SPECIMEN TO PATHOLOGY Shashi Montano MD 11/20/2023 0823        Blood loss: 5cc's     Indication:  Mayte Toney is a 53 y.o. female who presented to clinic with a right tympanic membrane perforation and r MHL. She has a history of tracheostomy for b/l TVF paralysis. After risks, benefits and alternatives were discussed patient was scheduled for this on 11/20/2023 at [facility] in [place] for tympanoplasty.    Findings:   40% right inferior central perforation below the umbo, not involving the annulus. There was a small cholesteatoma under the anterior rim of the perforation that was removed  Tragal cartilage graft placed medial to the handle of the malleus and the tympanic membrane remnant followed by perichondrium onlay over the graft and under the TM membrane followed by a second layer of fascia extending onto the posterior canal wall.     Procedure in detail:  The patient was identified in preop holding.  The right ear was marked and patient was transported to OR in bed safely and was laid supine on operating table, handed over to anesthetist, who intubated the patient.  Pneumatic compression  Stocking were placed .  IV Ancef given.  Facial monitoring electrodes were inserted. Patient was cleaned and prepped in sterile manner using betadine in the ear canal. The 0-degree endoscope was brought in, and EAC was washed  with saline solution.  An examination showed a 40% inferior central perforation not involving the annulus, just inferior to the umbo.  1% lidocaine with 1:100,000 epinephrine was then injected in the external auditory canal and tragus, 3 mL of this solution was used.  Palacios needle was used to freshen the margins of the perforation.  A round knife was used to make a posterior incision in the bony EAC, and this was curved at 6 o'clock and 12 o'clock, and then TM flap was carefully elevated up to the annulus.  Small area of annulus was elevated using the Palacios needle, and inferiorly the rest of the annulus was elevated using Gimmick, and posterosuperiorly, again  sharp dissection with the Palacios was done to raise the rest of the annulus.  Middle ear was reached.  The chorda tympani was seen and protected. There was a small cholesteatoma under anterior rim of her perforation that was removed and sent for permament pathology.  When the malleus handle was palpated, the IS joint was mobile.  Complete TM flap was elevated up to the handle of malleus, and the tympanic membrane was left attached to the handle of the malleus.  The tragal incision was then made, and tragal cartilage with  perichondrium on both sides was harvested for grafting.  Tragal incision  was closed with 5-0 fast-absorbing catgut stitch.  The cartilage was then  sized, and 1 big piece of round   Cartilage graft with perichondrium  attached on one side was fashioned.  On the other side, perichondrium was taken off and  pressed as a separate graft. Packing gel foam with floxin was placed anteriorly and inferiorly under the TM remnant to help support the graft.  The tragal cartilage  graft was then underlaid below TM remnant and medial to the handle of malleus. It was carefully tucked under the TM remnant. The TM flap was laid back down to ensure the graft extended anterior to the TM perforation. Then the perichondrium graft was placed lateral to the  cartilage and tucked under the TM remnant as well.  The TM flap was reposited back on top of the  perichondrium.  All the areas of margins of perforation were assessed and  adjusted in order to make them watertight. Small pieces of Gelfoam were kept in middle ear and on  reconstructed TM, filling the EAC completely.   Bactroban was instilled in the EAC and cotton ball was kept  in christopher.  This completed the procedure.  Facial nerve monitoring  electrodes were removed.  Patient was handed over to anesthetist, who  extubated the patient deep and safely transferred the patient to recovery.    11/20/2023  9:40 AM    Stephanie Sultana MD  [facility] Otolaryngology, PGY V

## 2023-11-20 NOTE — ANESTHESIA PREPROCEDURE EVALUATION
"                                                                                                             11/19/2023  Mayte Toney is a 53 y.o., female with PMHx of HTN, smoking, anxiety/depression presents for Rt tympanoplasty.    NO BETA BLOCKER USE    Active Ambulatory Problems     Diagnosis Date Noted    GERD (gastroesophageal reflux disease) 01/22/2020    Nausea 01/22/2020    H/O major abdominal surgery 01/22/2020    Bowel obstruction 11/14/2023     Resolved Ambulatory Problems     Diagnosis Date Noted    No Resolved Ambulatory Problems     Past Medical History:   Diagnosis Date    Allergies     Anxiety     Arthritis     Depression     Hypertension        Pre-op Assessment    I have reviewed the NPO Status.      Review of Systems  Anesthesia Hx:  No problems with previous Anesthesia                Social:  Smoker       Cardiovascular:     Hypertension, well controlled                                        Pulmonary:  Pulmonary Normal                       Renal/:  Renal/ Normal                 Hepatic/GI:  Hepatic/GI Normal                 Neurological:  Neurology Normal                                      Endocrine:  Endocrine Normal            Psych:   anxiety depression              Vitals:    11/20/23 0517 11/20/23 0522 11/20/23 0527 11/20/23 0618   BP: 104/73 104/73  124/86   Pulse: 76   78   Resp:    20   Temp: 36.7 °C (98 °F)   36.2 °C (97.2 °F)   TempSrc: Oral   Temporal   SpO2: 99%   100%   Weight:   57.9 kg (127 lb 9.6 oz)    Height:   5' 0.98" (1.549 m)          Physical Exam  General: Alert, Cooperative and Well nourished    Airway:  Pre-Existing Airway: Tracheostomy tube    Chest/Lungs:  Normal Respiratory Rate, Clear to auscultation    Heart:  Rate: Normal  Rhythm: Regular Rhythm  Sounds: Normal      Lab Results   Component Value Date    WBC 10.81 11/20/2023    HGB 12.3 11/20/2023    HCT 39.7 11/20/2023    MCV 89.8 11/20/2023     11/20/2023         CMP  Sodium   Date Value Ref " Range Status   01/20/2020 140 136 - 145 mmol/L Final     Potassium   Date Value Ref Range Status   01/20/2020 4.0 3.5 - 5.1 mmol/L Final     Chloride   Date Value Ref Range Status   01/20/2020 109 95 - 110 mmol/L Final     CO2   Date Value Ref Range Status   01/20/2020 25 23 - 29 mmol/L Final     Glucose   Date Value Ref Range Status   01/20/2020 101 70 - 110 mg/dL Final     BUN   Date Value Ref Range Status   01/20/2020 11 6 - 20 mg/dL Final     Creatinine   Date Value Ref Range Status   01/20/2020 0.7 0.5 - 1.4 mg/dL Final     Calcium   Date Value Ref Range Status   01/20/2020 8.7 8.7 - 10.5 mg/dL Final     Total Protein   Date Value Ref Range Status   01/20/2020 8.4 6.0 - 8.4 g/dL Final     Albumin   Date Value Ref Range Status   01/20/2020 3.9 3.5 - 5.2 g/dL Final     Total Bilirubin   Date Value Ref Range Status   01/20/2020 0.4 0.1 - 1.0 mg/dL Final     Comment:     For infants and newborns, interpretation of results should be based  on gestational age, weight and in agreement with clinical  observations.  Premature Infant recommended reference ranges:  Up to 24 hours.............<8.0 mg/dL  Up to 48 hours............<12.0 mg/dL  3-5 days..................<15.0 mg/dL  6-29 days.................<15.0 mg/dL       Alkaline Phosphatase   Date Value Ref Range Status   01/20/2020 72 55 - 135 U/L Final     AST   Date Value Ref Range Status   01/20/2020 36 10 - 40 U/L Final     ALT   Date Value Ref Range Status   01/20/2020 54 (H) 10 - 44 U/L Final     Anion Gap   Date Value Ref Range Status   01/20/2020 6 (L) 8 - 16 mmol/L Final                 Anesthesia Plan  Type of Anesthesia, risks & benefits discussed:    Anesthesia Type: Gen ETT  Intra-op Monitoring Plan: Standard ASA Monitors  Post Op Pain Control Plan: IV/PO Opioids PRN  Induction:  IV  Airway Plan: Via Tracheostomy  Informed Consent: Informed consent signed with the Patient and all parties understand the risks and agree with anesthesia plan.  All questions  answered.   ASA Score: 2  Day of Surgery Review of History & Physical: H&P Update referred to the surgeon/provider.    Ready For Surgery From Anesthesia Perspective.     .

## 2023-11-21 ENCOUNTER — NURSE TRIAGE (OUTPATIENT)
Dept: ADMINISTRATIVE | Facility: CLINIC | Age: 53
End: 2023-11-21
Payer: MEDICAID

## 2023-11-21 ENCOUNTER — HOSPITAL ENCOUNTER (EMERGENCY)
Facility: HOSPITAL | Age: 53
Discharge: HOME | End: 2023-11-21
Attending: EMERGENCY MEDICINE
Payer: MEDICAID

## 2023-11-21 VITALS
WEIGHT: 121.25 LBS | DIASTOLIC BLOOD PRESSURE: 86 MMHG | OXYGEN SATURATION: 100 % | SYSTOLIC BLOOD PRESSURE: 133 MMHG | HEART RATE: 73 BPM | HEIGHT: 61 IN | TEMPERATURE: 98 F | BODY MASS INDEX: 22.89 KG/M2 | RESPIRATION RATE: 18 BRPM

## 2023-11-21 DIAGNOSIS — G89.18 POST-OPERATIVE PAIN: Primary | ICD-10-CM

## 2023-11-21 DIAGNOSIS — H92.11 OTORRHEA OF RIGHT EAR: ICD-10-CM

## 2023-11-21 LAB
ESTROGEN SERPL-MCNC: NORMAL PG/ML
INSULIN SERPL-ACNC: NORMAL U[IU]/ML
LAB AP CLINICAL INFORMATION: NORMAL
LAB AP GROSS DESCRIPTION: NORMAL
LAB AP REPORT FOOTNOTES: NORMAL
T3RU NFR SERPL: NORMAL %

## 2023-11-21 PROCEDURE — 25000003 PHARM REV CODE 250: Performed by: PHYSICIAN ASSISTANT

## 2023-11-21 PROCEDURE — 99283 EMERGENCY DEPT VISIT LOW MDM: CPT

## 2023-11-21 RX ORDER — HYDROCODONE BITARTRATE AND ACETAMINOPHEN 10; 325 MG/1; MG/1
1 TABLET ORAL
Status: COMPLETED | OUTPATIENT
Start: 2023-11-21 | End: 2023-11-21

## 2023-11-21 RX ORDER — IBUPROFEN 800 MG/1
800 TABLET ORAL EVERY 8 HOURS PRN
Qty: 15 TABLET | Refills: 0 | Status: SHIPPED | OUTPATIENT
Start: 2023-11-21

## 2023-11-21 RX ADMIN — HYDROCODONE BITARTRATE AND ACETAMINOPHEN 1 TABLET: 10; 325 TABLET ORAL at 07:11

## 2023-11-21 NOTE — TELEPHONE ENCOUNTER
Pt is s/p right ear tympanoplasty yesterday. She calls now stating that some of the mesh that was used during the surgery is now coming out of her ear onto a cotton ball. She also notes bloody discharge from ear.    Care Advice recommends ED now. Pt verbalizes understanding and is instructed to call back with any new/worsening sxs, questions, or concerns. Pt states that she is getting a call from her Dr's nurse on her other line at this time.   Reason for Disposition   Sounds like a serious complication to the triager    Additional Information   Negative: Sounds like a life-threatening emergency to the triager   Negative: Bright red, wide-spread, sunburn-like rash   Negative: SEVERE headache and after spinal (epidural) anesthesia   Negative: Vomiting and persists > 4 hours   Negative: Vomiting and abdomen looks much more swollen than usual   Negative: Drinking very little and dehydration suspected (e.g., no urine > 12 hours, very dry mouth, very lightheaded)   Negative: Patient sounds very sick or weak to the triager    Protocols used: Post-Op Symptoms and [id number]

## 2023-11-22 NOTE — ED PROVIDER NOTES
Encounter Date: 2023       History     Chief Complaint   Patient presents with    Otalgia     PT REPORTS RT TYMPANOPLASTY SURG YESTERDAY. REPORTS INSTRUCTED TO CHANGE COTTON TODAY AND WHEN SHE DID THE MESH WAS COMING OUT TOO CAUSING PAIN W A LITTLE BLEEDING.       Mayte Toney is a 53 y.o. female with a history of HTN who presents to the ED complaining of right ear pain with bloody drainage. Patient had a right tympanoplasty yesterday. States she was told to apply cotton to her ear as needed to prevent getting her ear wet. She was changing the cotton today when she noticed bloody drainage and felt like a piece of mesh came out of place. Reports pain ever since. She is prescribed norco which was helping, but she has not taking it since noon.     The history is provided by the patient.     Review of patient's allergies indicates:   Allergen Reactions    Depakene [valproic acid]     Toradol [ketorolac]     Tramadol     Zofran [ondansetron hcl]     Lyrica [pregabalin] Nausea And Vomiting     Bad headaches     Past Medical History:   Diagnosis Date    Allergies     Anxiety     Arthritis     Bowel obstruction     Status post exploratory laparotomy with partial colectomy for treatment; no cancer was present.  No colostomy was needed.    Depression     Hypertension      Past Surgical History:   Procedure Laterality Date    ABDOMINAL SURGERY      Status post exploratory laparotomy with partial colon resection for bowel obstruction; no cancer present and no colostomy needed.    ACDF/PCDF  2021    APPENDECTOMY      CARTILAGE GRAFT, TO EAR FROM EAR, AUTOLOGOUS Right 2023    Procedure: CARTILAGE GRAFT,TO EAR FROM EAR,AUTOLOGOUS;  Surgeon: Shashi Montano MD;  Location: [facility];  Service: ENT;  Laterality: Right;     SECTION       x 2    GALLBLADDER SURGERY      HIATAL HERNIA REPAIR      HYSTERECTOMY      TONSILLECTOMY AND ADENOIDECTOMY      TRACHEOSTOMY  2023    TYMPANOPLASTY       TYMPANOPLASTY, ENDOSCOPIC Right 11/20/2023    Procedure: TYMPANOPLASTY, ENDOSCOPIC;  Surgeon: Shashi Montano MD;  Location: [facility];  Service: ENT;  Laterality: Right;  Plan for endoscopic procedure. Have operating microscope available.     Family History   Problem Relation Age of Onset    Arthritis Mother     Hypertension Mother     Arthritis Father     Asthma Father     Drug abuse Brother      Social History     Tobacco Use    Smoking status: Some Days     Current packs/day: 1.00     Types: Cigarettes    Smokeless tobacco: Never   Substance Use Topics    Alcohol use: Not Currently    Drug use: Not Currently     Review of Systems   Constitutional:  Negative for fever.   HENT:  Positive for ear discharge and ear pain. Negative for sore throat.    Respiratory:  Negative for shortness of breath.    Cardiovascular:  Negative for chest pain.   Gastrointestinal:  Negative for nausea.   Genitourinary:  Negative for dysuria.   Musculoskeletal:  Negative for back pain.   Skin:  Negative for rash.   Neurological:  Negative for weakness.   Hematological:  Does not bruise/bleed easily.       Physical Exam     Initial Vitals [11/21/23 1857]   BP Pulse Resp Temp SpO2   (!) 163/101 71 16 97.9 °F (36.6 °C) 100 %      MAP       --         Physical Exam    Nursing note and vitals reviewed.  Constitutional: She appears well-developed and well-nourished. No distress.   HENT:   Head: Normocephalic and atraumatic.   Right Ear: External ear normal.   Left Ear: External ear normal.   Mouth/Throat: No oropharyngeal exudate.   Left ear with blood and packing in canal. Outer ear normal.    Eyes: EOM are normal. No scleral icterus.   Neck: Neck supple.   Normal range of motion.  Cardiovascular:  Normal rate and regular rhythm.           No murmur heard.  Pulmonary/Chest: Breath sounds normal. No respiratory distress. She has no wheezes.   Abdominal: Abdomen is soft. Bowel sounds are normal. She exhibits no distension. There is no  abdominal tenderness.   Musculoskeletal:         General: No tenderness. Normal range of motion.      Cervical back: Normal range of motion and neck supple.     Neurological: She is alert and oriented to person, place, and time. No cranial nerve deficit.   Skin: Skin is warm and dry. Capillary refill takes less than 2 seconds. No erythema.   Psychiatric: She has a normal mood and affect. Her behavior is normal. Judgment and thought content normal.         ED Course   Procedures  Labs Reviewed - No data to display       Imaging Results    None          Medications   HYDROcodone-acetaminophen  mg per tablet 1 tablet (1 tablet Oral Given 11/21/23 1958)     Medical Decision Making  Discussed with ENT on call and they state bloody drainage is expected following this proccedure. Pt has dissolvable packing in place which is probably what she thought the mesh was. Given 10 mg norco here and patient is requesting discharge. She has follow up with ENT on Tuesday. Encouraged here to take ibuprofen 800 mg q8h prn in between norco. ED return precautions given. She verbalized understanding. All questions answered.     Risk  Prescription drug management.               ED Course as of 11/21/23 2008 Tue Nov 21, 2023 1934 Discussed with Dr. Sultana, ENT on call. She states that bloody drainage is normal for 1-2 weeks following typanoplasty. Patient has dissolvable packing in place which is likely what she felt when she removed the cotton ball. Can inc pain meds if needed and pt can follow up in clinic as scheduled.  [KD]      ED Course User Index  [KD] Stephanie Liang, HINA                        Clinical Impression:  Final diagnoses:  [G89.18] Post-operative pain (Primary)  [H92.11] Otorrhea of right ear          ED Disposition Condition    Discharge Stable          ED Prescriptions       Medication Sig Dispense Start Date End Date Auth. Provider    ibuprofen (ADVIL,MOTRIN) 800 MG tablet Take 1 tablet (800 mg total) by  mouth every 8 (eight) hours as needed for Pain. 15 tablet 11/21/2023 -- Stephanie Liang PA-C          Follow-up Information       Follow up With Specialties Details Why Contact Info Additional Information    [facility] Emergency Medicine  If symptoms worsen [facility] [id number] [phone]     [facility], Entrance 6 Otolaryngology On 11/28/2023 as scheduled for follow up [facility] [id number] [phone] [facility] ENT,  Entrance #6 Please sign with the  when you arrive.             Stephanie Liang PA-C  11/21/23 2008

## 2023-11-28 ENCOUNTER — OFFICE VISIT (OUTPATIENT)
Dept: OTOLARYNGOLOGY | Facility: CLINIC | Age: 53
End: 2023-11-28
Payer: MEDICAID

## 2023-11-28 VITALS
RESPIRATION RATE: 22 BRPM | BODY MASS INDEX: 25.48 KG/M2 | TEMPERATURE: 98 F | DIASTOLIC BLOOD PRESSURE: 88 MMHG | HEIGHT: 60 IN | OXYGEN SATURATION: 98 % | HEART RATE: 79 BPM | WEIGHT: 129.81 LBS | SYSTOLIC BLOOD PRESSURE: 134 MMHG

## 2023-11-28 DIAGNOSIS — H90.A31 MIXED CONDUCTIVE AND SENSORINEURAL HEARING LOSS OF RIGHT EAR WITH RESTRICTED HEARING OF LEFT EAR: ICD-10-CM

## 2023-11-28 DIAGNOSIS — H72.91 TYMPANIC MEMBRANE PERFORATION, RIGHT: Primary | ICD-10-CM

## 2023-11-28 PROCEDURE — 99215 OFFICE O/P EST HI 40 MIN: CPT | Mod: PBBFAC | Performed by: STUDENT IN AN ORGANIZED HEALTH CARE EDUCATION/TRAINING PROGRAM

## 2023-11-28 RX ORDER — HYDROCODONE BITARTRATE AND ACETAMINOPHEN 5; 325 MG/1; MG/1
1 TABLET ORAL EVERY 6 HOURS PRN
Qty: 12 TABLET | Refills: 0 | Status: SHIPPED | OUTPATIENT
Start: 2023-11-28 | End: 2023-12-05

## 2023-11-28 RX ORDER — OFLOXACIN 3 MG/ML
5 SOLUTION AURICULAR (OTIC) 2 TIMES DAILY
Qty: 5 ML | Refills: 5 | Status: SHIPPED | OUTPATIENT
Start: 2023-11-28 | End: 2023-12-28

## 2023-11-28 NOTE — PROGRESS NOTES
Patient Name: Mayte Toney   YOB: 1970     Chief Complaint: paralyzed vocal cord       History of Present Illness:  July 20, 2023:   52-year-old female with a history of smoking referred for evaluation of right true vocal cord paralysis.  Patient is status post ACDF/PCDF surgery in May 2021 which had been complicated by the development of hoarseness.  The patient was seen by Dr. Hellen Boggs on December 2, 2021 for evaluation of this.  At that time the patient had indicated that her voice was soft or with phonatory at that time she also felt like she needed to strain to talk and had difficulty obtaining a loud vocal volume.  Rigid videostroboscopy at that time showed paralyzed right true vocal cord but a slight height mismatch.  Left vocal cord was fully mobile.  Patient was referred for speech therapy which she undertook for 6 weeks but she did not notice any improvement in her voice.  She would not seen Dr. Boggs for follow-up.    The patient was referred here after she recently had unknown of on a bronchoscopy by her pulmonologist as part of her evaluation for shortness of breath.  He noticed the right true vocal cord paralysis and referred the patient here for further treatment and evaluation.  The patient does continue to have hoarseness where she will at times need to strain to talk and continues to have difficulty obtaining a loud vocal volume.  She does not have any coughing or choking with eating or swallowing.  In regards to her shortness of breath she notices this more when lying down at night.  Patient denies history of heartburn or indigestion.  She currently smokes 1/2 pack of cigarettes per day.  She had been a heavier smoker in the past.  She does continue to have neck and back problems and may need additional surgery.    Patient had also indicated that she does have a right tympanic membrane perforation which she sustained at the age of 3 when her brother had placed a Chalo pin  in her ear.  She had undergone what sounds to be a tympanoplasty with a skin graft.  Patient has noticed that the hearing in her right ear is diminished relative to the left.  She will experience periodic drainage from that ear he is not had any problems for the past several months.  She does try to keep the ear dry.  Left ear is without problems.  No vertigo.  She has not had any recent audiologic evaluations.    November 14, 2023:   Patient presents today for follow-up of her right tympanic membrane perforation in review of her audiogram done on October 18, 2023.  Patient has not had any problems with any pain or drainage since her last appointment.  She is keeping water out of the ear.  The audiogram showed a moderate least severe mixed hearing loss in the right ear with an air bone gap of 20 to 50 decibel air bone gap with a speech reception threshold of 55 decibels and 100% discrimination.  In the left ear she had a normal to moderate sensorineural hearing loss with speech reception threshold of 20 decibels and 100% discrimination and a type a tympanogram.  Results were discussed with the patient.  In regards to her paralysis the patient had seen Dr. Pérez in the patient did require an urgent tracheostomy on a August 25, 2023, when she was found to have a new left true vocal cord paresis and subglottic mucus plug resulting in respiratory distress and altered mental status necessitating eventual intubation.  Patient does have follow-up with Dr. Pérez is coming spring.  Patient is tolerating regular diet at this time.  Of note the patient does continue to smoke but this is not on a daily basis.    11/28/23: Here today for first post-op appt after R endoscopic tplasty with tragal cartilage graft on 11/20/23. Doing well. Still having some pain in the right ear and behind the ear mostly at night. It's getting better but not resolved. Having some bloody drainage from the right ear.     Past Medical History:  Past  Medical History:   Diagnosis Date    Allergies     Anxiety     Arthritis     Bowel obstruction     Status post exploratory laparotomy with partial colectomy for treatment; no cancer was present.  No colostomy was needed.    Depression     Hypertension      Past Surgical History:   Procedure Laterality Date    ABDOMINAL SURGERY      Status post exploratory laparotomy with partial colon resection for bowel obstruction; no cancer present and no colostomy needed.    ACDF/PCDF  2021    APPENDECTOMY      CARTILAGE GRAFT, TO EAR FROM EAR, AUTOLOGOUS Right 2023    Procedure: CARTILAGE GRAFT,TO EAR FROM EAR,AUTOLOGOUS;  Surgeon: Shashi Montano MD;  Location: [facility] OR;  Service: ENT;  Laterality: Right;     SECTION       x 2    GALLBLADDER SURGERY      HIATAL HERNIA REPAIR      HYSTERECTOMY      TONSILLECTOMY AND ADENOIDECTOMY      TRACHEOSTOMY  2023    TYMPANOPLASTY      TYMPANOPLASTY, ENDOSCOPIC Right 2023    Procedure: TYMPANOPLASTY, ENDOSCOPIC;  Surgeon: Shashi Montano MD;  Location: [facility] OR;  Service: ENT;  Laterality: Right;  Plan for endoscopic procedure. Have operating microscope available.       Review of Systems:  Unremarkable except as mentioned above.    Current Medications:  Current Outpatient Medications   Medication Sig    albuterol (PROVENTIL/VENTOLIN HFA) 90 mcg/actuation inhaler 2 puffs every 6 (six) hours as needed.    ARIPiprazole (ABILIFY) 10 MG Tab Take 10 mg by mouth once daily.    baclofen (LIORESAL) 20 MG tablet Take 20 mg by mouth 3 (three) times daily.    clonazePAM (KLONOPIN) 1 MG tablet Take 1 mg by mouth 3 (three) times daily.    cyclobenzaprine (FLEXERIL) 10 MG tablet Take 10 mg by mouth every 8 (eight) hours as needed.    dextroamphetamine-amphetamine (ADDERALL XR) 30 MG 24 hr capsule Take 30 mg by mouth 2 (two) times daily.    diclofenac (VOLTAREN) 75 MG EC tablet Take 75 mg by mouth 2 (two) times daily as needed.    docusate sodium (COLACE) 100 MG  capsule Take 100 mg by mouth 2 (two) times daily.    estrogens, conjugated, (PREMARIN) 0.625 MG tablet Take 0.625 mg by mouth once daily.    gabapentin (NEURONTIN) 300 MG capsule Take 300 mg by mouth every 8 (eight) hours.    HYDROcodone-acetaminophen (NORCO) 5-325 mg per tablet Take 1 tablet by mouth every 6 (six) hours as needed for Pain.    ibuprofen (ADVIL,MOTRIN) 800 MG tablet Take 1 tablet (800 mg total) by mouth every 8 (eight) hours as needed for Pain.    levETIRAcetam (KEPPRA) 500 MG Tab Take 500 mg by mouth 2 (two) times daily.    levocetirizine (XYZAL) 5 MG tablet Take 5 mg by mouth every evening.    naproxen (NAPROSYN) 500 MG tablet Take 500 mg by mouth 2 (two) times daily.    ofloxacin (FLOXIN) 0.3 % otic solution Place 5 drops into the right ear 2 (two) times daily.    OLANZapine (ZYPREXA) 5 MG tablet Take 5 mg by mouth every evening.    pantoprazole (PROTONIX) 20 MG tablet Take 1 tablet (20 mg total) by mouth once daily.    polyethylene glycol (GLYCOLAX) 17 gram/dose powder Take 17 g by mouth once daily. (Patient not taking: Reported on 7/20/2023)    promethazine (PHENERGAN) 25 MG tablet Take 25 mg by mouth every 6 (six) hours as needed for Nausea.    SYMBICORT 160-4.5 mcg/actuation HFAA 2 puffs 2 (two) times daily.    venlafaxine (EFFEXOR) 75 MG tablet Take 75 mg by mouth 2 (two) times daily.     No current facility-administered medications for this visit.        Allergies:  Review of patient's allergies indicates:   Allergen Reactions    Depakene [valproic acid]     Toradol [ketorolac]     Tramadol     Zofran [ondansetron hcl]     Lyrica [pregabalin] Nausea And Vomiting     Bad headaches        Physical Exam:  Vital signs:   Vitals:    11/28/23 1413   BP: 134/88   BP Location: Left arm   Patient Position: Sitting   BP Method: Medium (Automatic)   Pulse: 79   Resp: (!) 22   Temp: 98.1 °F (36.7 °C)   TempSrc: Oral   SpO2: 98%   Weight: 58.9 kg (129 lb 12.8 oz)   Height: 5' (1.524 m)     Right EAC with  bloody gel foam packing, tragal sutures intact  Trach in place with PMV    Audiogram:        Assessment/Plan:  53-year-old female with longstanding right tympanic membrane perforation associated with a mixed hearing loss with a 20-50 decibel air bone gap without history of recurrent otorrhea.  S/p right endoscopic tplasty with tragal cartilage graft on 11/20/23. Doing well.     Plan:  Start floxin drops BID x 3-4 weeks. 12 more norco pills prescribed to help her pain at night.   RTC in 3-4 weeks. The packing should be mostly dissolved at that point for us to be able to see the graft and remove any remaining packing.   Continue dry ear precautions. Continue no nose blowing.     Stephanie Sultana, PGY-5  [facility]

## 2023-12-12 NOTE — PROGRESS NOTES
I reviewed the history and physical exam with the resident.   I agree with findings and plan.    Shashi Montano M.D.

## 2023-12-29 PROBLEM — H72.91 TYMPANIC MEMBRANE PERFORATION, RIGHT: Status: ACTIVE | Noted: 2023-12-29

## 2023-12-29 PROBLEM — H90.A31 MIXED CONDUCTIVE AND SENSORINEURAL HEARING LOSS OF RIGHT EAR WITH RESTRICTED HEARING OF LEFT EAR: Status: ACTIVE | Noted: 2023-12-29

## 2024-01-02 ENCOUNTER — OFFICE VISIT (OUTPATIENT)
Dept: OTOLARYNGOLOGY | Facility: CLINIC | Age: 54
End: 2024-01-02
Payer: MEDICAID

## 2024-01-02 VITALS
HEIGHT: 60 IN | BODY MASS INDEX: 25.52 KG/M2 | HEART RATE: 76 BPM | SYSTOLIC BLOOD PRESSURE: 131 MMHG | WEIGHT: 130 LBS | DIASTOLIC BLOOD PRESSURE: 76 MMHG | OXYGEN SATURATION: 98 % | TEMPERATURE: 97 F | RESPIRATION RATE: 20 BRPM

## 2024-01-02 DIAGNOSIS — Z98.890 HISTORY OF TYMPANOPLASTY OF RIGHT EAR: ICD-10-CM

## 2024-01-02 DIAGNOSIS — H72.91 TYMPANIC MEMBRANE PERFORATION, RIGHT: Primary | ICD-10-CM

## 2024-01-02 PROCEDURE — 99215 OFFICE O/P EST HI 40 MIN: CPT | Mod: PBBFAC | Performed by: STUDENT IN AN ORGANIZED HEALTH CARE EDUCATION/TRAINING PROGRAM

## 2024-01-02 PROCEDURE — 92504 EAR MICROSCOPY EXAMINATION: CPT | Mod: PBBFAC | Performed by: STUDENT IN AN ORGANIZED HEALTH CARE EDUCATION/TRAINING PROGRAM

## 2024-01-02 RX ORDER — OFLOXACIN 3 MG/ML
SOLUTION/ DROPS OPHTHALMIC
Qty: 10 ML | Refills: 1 | Status: SHIPPED | OUTPATIENT
Start: 2024-01-02 | End: 2024-01-31 | Stop reason: ALTCHOICE

## 2024-01-02 RX ORDER — OXYCODONE HYDROCHLORIDE 5 MG/1
5 TABLET ORAL EVERY 4 HOURS PRN
Qty: 5 TABLET | Refills: 0 | Status: SHIPPED | OUTPATIENT
Start: 2024-01-02

## 2024-01-02 RX ORDER — AMOXICILLIN AND CLAVULANATE POTASSIUM 875; 125 MG/1; MG/1
1 TABLET, FILM COATED ORAL EVERY 12 HOURS
Qty: 14 TABLET | Refills: 0 | Status: SHIPPED | OUTPATIENT
Start: 2024-01-02 | End: 2024-01-09

## 2024-01-02 NOTE — PROGRESS NOTES
Patient Name: Mayte Toney   YOB: 1970     Chief Complaint: paralyzed vocal cord       History of Present Illness:  July 20, 2023:   52-year-old female with a history of smoking referred for evaluation of right true vocal cord paralysis.  Patient is status post ACDF/PCDF surgery in May 2021 which had been complicated by the development of hoarseness.  The patient was seen by Dr. Hellen Boggs on December 2, 2021 for evaluation of this.  At that time the patient had indicated that her voice was soft or with phonatory at that time she also felt like she needed to strain to talk and had difficulty obtaining a loud vocal volume.  Rigid videostroboscopy at that time showed paralyzed right true vocal cord but a slight height mismatch.  Left vocal cord was fully mobile.  Patient was referred for speech therapy which she undertook for 6 weeks but she did not notice any improvement in her voice.  She would not seen Dr. Boggs for follow-up.    The patient was referred here after she recently had unknown of on a bronchoscopy by her pulmonologist as part of her evaluation for shortness of breath.  He noticed the right true vocal cord paralysis and referred the patient here for further treatment and evaluation.  The patient does continue to have hoarseness where she will at times need to strain to talk and continues to have difficulty obtaining a loud vocal volume.  She does not have any coughing or choking with eating or swallowing.  In regards to her shortness of breath she notices this more when lying down at night.  Patient denies history of heartburn or indigestion.  She currently smokes 1/2 pack of cigarettes per day.  She had been a heavier smoker in the past.  She does continue to have neck and back problems and may need additional surgery.    Patient had also indicated that she does have a right tympanic membrane perforation which she sustained at the age of 3 when her brother had placed a Chalo pin  in her ear.  She had undergone what sounds to be a tympanoplasty with a skin graft.  Patient has noticed that the hearing in her right ear is diminished relative to the left.  She will experience periodic drainage from that ear he is not had any problems for the past several months.  She does try to keep the ear dry.  Left ear is without problems.  No vertigo.  She has not had any recent audiologic evaluations.    November 14, 2023:   Patient presents today for follow-up of her right tympanic membrane perforation in review of her audiogram done on October 18, 2023.  Patient has not had any problems with any pain or drainage since her last appointment.  She is keeping water out of the ear.  The audiogram showed a moderate least severe mixed hearing loss in the right ear with an air bone gap of 20 to 50 decibel air bone gap with a speech reception threshold of 55 decibels and 100% discrimination.  In the left ear she had a normal to moderate sensorineural hearing loss with speech reception threshold of 20 decibels and 100% discrimination and a type a tympanogram.  Results were discussed with the patient.  In regards to her paralysis the patient had seen Dr. Pérez in the patient did require an urgent tracheostomy on a August 25, 2023, when she was found to have a new left true vocal cord paresis and subglottic mucus plug resulting in respiratory distress and altered mental status necessitating eventual intubation.  Patient does have follow-up with Dr. Pérez is coming spring.  Patient is tolerating regular diet at this time.  Of note the patient does continue to smoke but this is not on a daily basis.    11/28/23: Here today for first post-op appt after R endoscopic tplasty with tragal cartilage graft on 11/20/23. Doing well. Still having some pain in the right ear and behind the ear mostly at night. It's getting better but not resolved. Having some bloody drainage from the right ear.     1/2/24: Patient is here for  second follow up appointment. She is doing ok, but has intermittent pain in the right ear and behind the ear mostly at night. Not much more drainage. She has been using the drops, but when she does she feels like they run down her throat. She has also been having intense pain at her trach site for the last few days with some drainage.     Past Medical History:  Past Medical History:   Diagnosis Date    Allergies     Anxiety     Arthritis     Bowel obstruction     Status post exploratory laparotomy with partial colectomy for treatment; no cancer was present.  No colostomy was needed.    Depression     Hypertension      Past Surgical History:   Procedure Laterality Date    ABDOMINAL SURGERY      Status post exploratory laparotomy with partial colon resection for bowel obstruction; no cancer present and no colostomy needed.    ACDF/PCDF  2021    APPENDECTOMY      CARTILAGE GRAFT, TO EAR FROM EAR, AUTOLOGOUS Right 2023    Procedure: CARTILAGE GRAFT,TO EAR FROM EAR,AUTOLOGOUS;  Surgeon: Shashi Montano MD;  Location: [facility];  Service: ENT;  Laterality: Right;     SECTION       x 2    GALLBLADDER SURGERY      HIATAL HERNIA REPAIR      HYSTERECTOMY      TONSILLECTOMY AND ADENOIDECTOMY      TRACHEOSTOMY  2023    TYMPANOPLASTY      TYMPANOPLASTY, ENDOSCOPIC Right 2023    Procedure: TYMPANOPLASTY, ENDOSCOPIC;  Surgeon: Shashi Montano MD;  Location: [facility] OR;  Service: ENT;  Laterality: Right;  Plan for endoscopic procedure. Have operating microscope available.       Review of Systems:  Unremarkable except as mentioned above.    Current Medications:  Current Outpatient Medications   Medication Sig    albuterol (PROVENTIL/VENTOLIN HFA) 90 mcg/actuation inhaler 2 puffs every 6 (six) hours as needed.    amoxicillin-clavulanate 875-125mg (AUGMENTIN) 875-125 mg per tablet Take 1 tablet by mouth every 12 (twelve) hours. for 7 days    ARIPiprazole (ABILIFY) 10 MG Tab Take 10 mg by mouth  once daily.    baclofen (LIORESAL) 20 MG tablet Take 20 mg by mouth 3 (three) times daily.    clonazePAM (KLONOPIN) 1 MG tablet Take 1 mg by mouth 3 (three) times daily.    cyclobenzaprine (FLEXERIL) 10 MG tablet Take 10 mg by mouth every 8 (eight) hours as needed.    dextroamphetamine-amphetamine (ADDERALL XR) 30 MG 24 hr capsule Take 30 mg by mouth 2 (two) times daily.    diclofenac (VOLTAREN) 75 MG EC tablet Take 75 mg by mouth 2 (two) times daily as needed.    docusate sodium (COLACE) 100 MG capsule Take 100 mg by mouth 2 (two) times daily.    estrogens, conjugated, (PREMARIN) 0.625 MG tablet Take 0.625 mg by mouth once daily.    gabapentin (NEURONTIN) 300 MG capsule Take 300 mg by mouth every 8 (eight) hours.    ibuprofen (ADVIL,MOTRIN) 800 MG tablet Take 1 tablet (800 mg total) by mouth every 8 (eight) hours as needed for Pain.    levETIRAcetam (KEPPRA) 500 MG Tab Take 500 mg by mouth 2 (two) times daily.    levocetirizine (XYZAL) 5 MG tablet Take 5 mg by mouth every evening.    naproxen (NAPROSYN) 500 MG tablet Take 500 mg by mouth 2 (two) times daily.    ofloxacin (OCUFLOX) 0.3 % ophthalmic solution Please use 4 drops in the right ear twice daily for 10 days.    OLANZapine (ZYPREXA) 5 MG tablet Take 5 mg by mouth every evening.    pantoprazole (PROTONIX) 20 MG tablet Take 1 tablet (20 mg total) by mouth once daily.    polyethylene glycol (GLYCOLAX) 17 gram/dose powder Take 17 g by mouth once daily. (Patient not taking: Reported on 7/20/2023)    promethazine (PHENERGAN) 25 MG tablet Take 25 mg by mouth every 6 (six) hours as needed for Nausea.    SYMBICORT 160-4.5 mcg/actuation HFAA 2 puffs 2 (two) times daily.    venlafaxine (EFFEXOR) 75 MG tablet Take 75 mg by mouth 2 (two) times daily.     No current facility-administered medications for this visit.        Allergies:  Review of patient's allergies indicates:   Allergen Reactions    Depakene [valproic acid]     Toradol [ketorolac]     Tramadol     Zofran  [ondansetron hcl]     Lyrica [pregabalin] Nausea And Vomiting     Bad headaches        Physical Exam:  Vital signs:   Vitals:    01/02/24 1342   BP: 131/76   BP Location: Left arm   Patient Position: Sitting   BP Method: Medium (Automatic)   Pulse: 76   Resp: 20   Temp: 97.3 °F (36.3 °C)   TempSrc: Oral   SpO2: 98%   Weight: 59 kg (130 lb)   Height: 5' (1.524 m)     Right EAC with crusted blood in lateral EAC, removed  Still some wet gelfoam adherent to TM. Patient unable to tolerate removal with suction, so this was deferred. Concern for perforation in anterior inferior aspect of TM, but again, difficult to see with Gelfoam occluding the TM  Trach in place with PMV, some erythematous stomal tissue with granulation and scant purulence inferiorly. Exquisitely tender to palpation    Audiogram:        Assessment/Plan:  Mayte Toney is a 53 y.o. with longstanding right tympanic membrane perforation associated with a mixed hearing loss with a 20-50 decibel air bone gap without history of recurrent otorrhea.  S/p right endoscopic tplasty with tragal cartilage graft on 11/20/23. Still having some right otalgia and still has gelfoam adherent to TM today, although concerned that there still may be a remaining perforation given the appearance. Also with mild tracheitis and purulent secretions.    Plan:  - Continue floxin drops BID x 2 weeks. 5 more oxycodone pills to help her at night. Discussed taking Tylenol and ibuprofen on a schedule so she does not require any narcotics. Would be hesitant to prescribe any further narcotics after this.   - Augmentin x 7 days for tracheitis. Patient is to follow up with Dr. Boggs in March.   - RTC in 3 weeks. The packing should be mostly dissolved at that point for us to be able to see the graft and remove any remaining packing.   - Continue dry ear precautions. Continue no nose blowing.     Mariusz Early MD  [employer] PGY-4  01/02/2024 5:05 PM

## 2024-01-31 ENCOUNTER — OFFICE VISIT (OUTPATIENT)
Dept: OTOLARYNGOLOGY | Facility: CLINIC | Age: 54
End: 2024-01-31
Payer: MEDICAID

## 2024-01-31 VITALS
BODY MASS INDEX: 26.7 KG/M2 | TEMPERATURE: 98 F | DIASTOLIC BLOOD PRESSURE: 79 MMHG | RESPIRATION RATE: 18 BRPM | WEIGHT: 136 LBS | HEART RATE: 80 BPM | SYSTOLIC BLOOD PRESSURE: 113 MMHG | HEIGHT: 60 IN

## 2024-01-31 DIAGNOSIS — H92.01 RIGHT EAR PAIN: Primary | ICD-10-CM

## 2024-01-31 DIAGNOSIS — Z98.890 HISTORY OF TYMPANOPLASTY OF RIGHT EAR: ICD-10-CM

## 2024-01-31 DIAGNOSIS — H72.91 TYMPANIC MEMBRANE PERFORATION, RIGHT: ICD-10-CM

## 2024-01-31 DIAGNOSIS — H90.A11 CONDUCTIVE HEARING LOSS OF RIGHT EAR WITH RESTRICTED HEARING OF LEFT EAR: ICD-10-CM

## 2024-01-31 PROCEDURE — 1159F MED LIST DOCD IN RCRD: CPT | Mod: CPTII,,, | Performed by: NURSE PRACTITIONER

## 2024-01-31 PROCEDURE — 3078F DIAST BP <80 MM HG: CPT | Mod: CPTII,,, | Performed by: NURSE PRACTITIONER

## 2024-01-31 PROCEDURE — 3008F BODY MASS INDEX DOCD: CPT | Mod: CPTII,,, | Performed by: NURSE PRACTITIONER

## 2024-01-31 PROCEDURE — 3074F SYST BP LT 130 MM HG: CPT | Mod: CPTII,,, | Performed by: NURSE PRACTITIONER

## 2024-01-31 PROCEDURE — 99214 OFFICE O/P EST MOD 30 MIN: CPT | Mod: S$PBB,,, | Performed by: NURSE PRACTITIONER

## 2024-01-31 PROCEDURE — 99214 OFFICE O/P EST MOD 30 MIN: CPT | Mod: PBBFAC | Performed by: NURSE PRACTITIONER

## 2024-01-31 RX ORDER — CIPROFLOXACIN AND DEXAMETHASONE 3; 1 MG/ML; MG/ML
4 SUSPENSION/ DROPS AURICULAR (OTIC) 2 TIMES DAILY
Qty: 7.5 ML | Refills: 0 | Status: SHIPPED | OUTPATIENT
Start: 2024-01-31

## 2024-01-31 RX ORDER — CYCLOBENZAPRINE HCL 10 MG
10 TABLET ORAL NIGHTLY PRN
Qty: 14 TABLET | Refills: 0 | Status: SHIPPED | OUTPATIENT
Start: 2024-01-31 | End: 2024-02-14

## 2024-01-31 NOTE — PROGRESS NOTES
Patient Name: Mayte Toney   YOB: 1970     Chief Complaint: f/u ear       History of Present Illness:  July 20, 2023:   52-year-old female with a history of smoking referred for evaluation of right true vocal cord paralysis.  Patient is status post ACDF/PCDF surgery in May 2021 which had been complicated by the development of hoarseness.  The patient was seen by Dr. Hellen Boggs on December 2, 2021 for evaluation of this.  At that time the patient had indicated that her voice was soft or with phonatory at that time she also felt like she needed to strain to talk and had difficulty obtaining a loud vocal volume.  Rigid videostroboscopy at that time showed paralyzed right true vocal cord but a slight height mismatch.  Left vocal cord was fully mobile.  Patient was referred for speech therapy which she undertook for 6 weeks but she did not notice any improvement in her voice.  She would not seen Dr. Boggs for follow-up.    The patient was referred here after she recently had unknown of on a bronchoscopy by her pulmonologist as part of her evaluation for shortness of breath.  He noticed the right true vocal cord paralysis and referred the patient here for further treatment and evaluation.  The patient does continue to have hoarseness where she will at times need to strain to talk and continues to have difficulty obtaining a loud vocal volume.  She does not have any coughing or choking with eating or swallowing.  In regards to her shortness of breath she notices this more when lying down at night.  Patient denies history of heartburn or indigestion.  She currently smokes 1/2 pack of cigarettes per day.  She had been a heavier smoker in the past.  She does continue to have neck and back problems and may need additional surgery.    Patient had also indicated that she does have a right tympanic membrane perforation which she sustained at the age of 3 when her brother had placed a Chalo pin in her ear.   She had undergone what sounds to be a tympanoplasty with a skin graft.  Patient has noticed that the hearing in her right ear is diminished relative to the left.  She will experience periodic drainage from that ear he is not had any problems for the past several months.  She does try to keep the ear dry.  Left ear is without problems.  No vertigo.  She has not had any recent audiologic evaluations.    November 14, 2023:   Patient presents today for follow-up of her right tympanic membrane perforation in review of her audiogram done on October 18, 2023.  Patient has not had any problems with any pain or drainage since her last appointment.  She is keeping water out of the ear.  The audiogram showed a moderate least severe mixed hearing loss in the right ear with an air bone gap of 20 to 50 decibel air bone gap with a speech reception threshold of 55 decibels and 100% discrimination.  In the left ear she had a normal to moderate sensorineural hearing loss with speech reception threshold of 20 decibels and 100% discrimination and a type a tympanogram.  Results were discussed with the patient.  In regards to her paralysis the patient had seen Dr. Péerz in the patient did require an urgent tracheostomy on a August 25, 2023, when she was found to have a new left true vocal cord paresis and subglottic mucus plug resulting in respiratory distress and altered mental status necessitating eventual intubation.  Patient does have follow-up with Dr. Pérez is coming spring.  Patient is tolerating regular diet at this time.  Of note the patient does continue to smoke but this is not on a daily basis.    11/28/23: Here today for first post-op appt after R endoscopic tplasty with tragal cartilage graft on 11/20/23. Doing well. Still having some pain in the right ear and behind the ear mostly at night. It's getting better but not resolved. Having some bloody drainage from the right ear.     1/2/24: Patient is here for second  follow up appointment. She is doing ok, but has intermittent pain in the right ear and behind the ear mostly at night. Not much more drainage. She has been using the drops, but when she does she feels like they run down her throat. She has also been having intense pain at her trach site for the last few days with some drainage.     24: Pt states she has been continuing to have significant right otalgia, especially in the evening. States [place] [facility] [place] dx with [place]. Reports she was given PCN shot.     Past Medical History:  Past Medical History:   Diagnosis Date    Allergies     Anxiety     Arthritis     Bowel obstruction     Status post exploratory laparotomy with partial colectomy for treatment; no cancer was present.  No colostomy was needed.    Depression     Hypertension      Past Surgical History:   Procedure Laterality Date    ABDOMINAL SURGERY      Status post exploratory laparotomy with partial colon resection for bowel obstruction; no cancer present and no colostomy needed.    ACDF/PCDF  2021    APPENDECTOMY      CARTILAGE GRAFT, TO EAR FROM EAR, AUTOLOGOUS Right 2023    Procedure: CARTILAGE GRAFT,TO EAR FROM EAR,AUTOLOGOUS;  Surgeon: Shashi Montano MD;  Location: [facility];  Service: ENT;  Laterality: Right;     SECTION       x 2    GALLBLADDER SURGERY      HIATAL HERNIA REPAIR      HYSTERECTOMY      TONSILLECTOMY AND ADENOIDECTOMY      TRACHEOSTOMY  2023    TYMPANOPLASTY      TYMPANOPLASTY, ENDOSCOPIC Right 2023    Procedure: TYMPANOPLASTY, ENDOSCOPIC;  Surgeon: Shashi Montano MD;  Location: [facility];  Service: ENT;  Laterality: Right;  Plan for endoscopic procedure. Have operating microscope available.       Review of Systems:  Unremarkable except as mentioned above.    Current Medications:  Current Outpatient Medications   Medication Sig    albuterol (PROVENTIL/VENTOLIN HFA) 90 mcg/actuation inhaler 2 puffs every 6 (six)  hours as needed.    amoxicillin-clavulanate 875-125mg (AUGMENTIN) 875-125 mg per tablet Take 1 tablet by mouth every 12 (twelve) hours. for 7 days    ARIPiprazole (ABILIFY) 10 MG Tab Take 10 mg by mouth once daily.    baclofen (LIORESAL) 20 MG tablet Take 20 mg by mouth 3 (three) times daily.    clonazePAM (KLONOPIN) 1 MG tablet Take 1 mg by mouth 3 (three) times daily.    cyclobenzaprine (FLEXERIL) 10 MG tablet Take 10 mg by mouth every 8 (eight) hours as needed.    dextroamphetamine-amphetamine (ADDERALL XR) 30 MG 24 hr capsule Take 30 mg by mouth 2 (two) times daily.    diclofenac (VOLTAREN) 75 MG EC tablet Take 75 mg by mouth 2 (two) times daily as needed.    docusate sodium (COLACE) 100 MG capsule Take 100 mg by mouth 2 (two) times daily.    estrogens, conjugated, (PREMARIN) 0.625 MG tablet Take 0.625 mg by mouth once daily.    gabapentin (NEURONTIN) 300 MG capsule Take 300 mg by mouth every 8 (eight) hours.    ibuprofen (ADVIL,MOTRIN) 800 MG tablet Take 1 tablet (800 mg total) by mouth every 8 (eight) hours as needed for Pain.    levETIRAcetam (KEPPRA) 500 MG Tab Take 500 mg by mouth 2 (two) times daily.    levocetirizine (XYZAL) 5 MG tablet Take 5 mg by mouth every evening.    naproxen (NAPROSYN) 500 MG tablet Take 500 mg by mouth 2 (two) times daily.    ofloxacin (OCUFLOX) 0.3 % ophthalmic solution Please use 4 drops in the right ear twice daily for 10 days.    OLANZapine (ZYPREXA) 5 MG tablet Take 5 mg by mouth every evening.    pantoprazole (PROTONIX) 20 MG tablet Take 1 tablet (20 mg total) by mouth once daily.    polyethylene glycol (GLYCOLAX) 17 gram/dose powder Take 17 g by mouth once daily. (Patient not taking: Reported on 7/20/2023)    promethazine (PHENERGAN) 25 MG tablet Take 25 mg by mouth every 6 (six) hours as needed for Nausea.    SYMBICORT 160-4.5 mcg/actuation HFAA 2 puffs 2 (two) times daily.    venlafaxine (EFFEXOR) 75 MG tablet Take 75 mg by mouth 2 (two) times daily.     No current  facility-administered medications for this visit.        Allergies:  Review of patient's allergies indicates:   Allergen Reactions    Depakene [valproic acid]     Toradol [ketorolac]     Tramadol     Zofran [ondansetron hcl]     Lyrica [pregabalin] Nausea And Vomiting     Bad headaches        Physical Exam:  Vital signs:   Vitals:    01/02/24 1342   BP: 131/76   BP Location: Left arm   Patient Position: Sitting   BP Method: Medium (Automatic)   Pulse: 76   Resp: 20   Temp: 97.3 °F (36.3 °C)   TempSrc: Oral   SpO2: 98%   Weight: 59 kg (130 lb)   Height: 5' (1.524 m)     Right EAC with cerumen and small amt of wet gelfoam suctioned from lateral canal. There is some dry squamous appearing tissue posteriorly which is adherent to TM. Pt unable to tolerate removal 2/2 pain. Also with point tenderness just inferior and posterior to right ear lobe.   OC/OP: No lesions/masses, no purulence or erythema  Nose: No rhinorrhea    Audiogram:        Assessment/Plan:  Mayte Toney is a 53 y.o. with longstanding right tympanic membrane perforation associated with a mixed hearing loss with a 20-50 decibel air bone gap without history of recurrent otorrhea.  S/p right endoscopic tplasty with tragal cartilage graft on 11/20/23. Still having some right otalgia not explained by exam findings and still has squamous debris adherent to TM today. D/w Dr. Ealry. Will obtain imaging to further evaluate.     Plan:  - Cipro drops BID.   - Discussed rotating Tylenol and ibuprofen on a schedule so she does not require any narcotics  - Flexeril QHS prn  - Patient is to follow up with Dr. Boggs in March.   - CT temporal bones   - Continue dry ear precautions. Continue no nose blowing.   - RTC 3 weeks Res

## 2024-01-31 NOTE — PROGRESS NOTES
Patient Name: Mayte Toney   YOB: 1970     Chief Complaint: paralyzed vocal cord       History of Present Illness:  July 20, 2023:   52-year-old female with a history of smoking referred for evaluation of right true vocal cord paralysis.  Patient is status post ACDF/PCDF surgery in May 2021 which had been complicated by the development of hoarseness.  The patient was seen by Dr. Hellen Boggs on December 2, 2021 for evaluation of this.  At that time the patient had indicated that her voice was soft or with phonatory at that time she also felt like she needed to strain to talk and had difficulty obtaining a loud vocal volume.  Rigid videostroboscopy at that time showed paralyzed right true vocal cord but a slight height mismatch.  Left vocal cord was fully mobile.  Patient was referred for speech therapy which she undertook for 6 weeks but she did not notice any improvement in her voice.  She would not seen Dr. Boggs for follow-up.    The patient was referred here after she recently had unknown of on a bronchoscopy by her pulmonologist as part of her evaluation for shortness of breath.  He noticed the right true vocal cord paralysis and referred the patient here for further treatment and evaluation.  The patient does continue to have hoarseness where she will at times need to strain to talk and continues to have difficulty obtaining a loud vocal volume.  She does not have any coughing or choking with eating or swallowing.  In regards to her shortness of breath she notices this more when lying down at night.  Patient denies history of heartburn or indigestion.  She currently smokes 1/2 pack of cigarettes per day.  She had been a heavier smoker in the past.  She does continue to have neck and back problems and may need additional surgery.    Patient had also indicated that she does have a right tympanic membrane perforation which she sustained at the age of 3 when her brother had placed a Chalo pin  in her ear.  She had undergone what sounds to be a tympanoplasty with a skin graft.  Patient has noticed that the hearing in her right ear is diminished relative to the left.  She will experience periodic drainage from that ear he is not had any problems for the past several months.  She does try to keep the ear dry.  Left ear is without problems.  No vertigo.  She has not had any recent audiologic evaluations.    November 14, 2023:   Patient presents today for follow-up of her right tympanic membrane perforation in review of her audiogram done on October 18, 2023.  Patient has not had any problems with any pain or drainage since her last appointment.  She is keeping water out of the ear.  The audiogram showed a moderate least severe mixed hearing loss in the right ear with an air bone gap of 20 to 50 decibel air bone gap with a speech reception threshold of 55 decibels and 100% discrimination.  In the left ear she had a normal to moderate sensorineural hearing loss with speech reception threshold of 20 decibels and 100% discrimination and a type a tympanogram.  Results were discussed with the patient.  In regards to her paralysis the patient had seen Dr. Pérez in the patient did require an urgent tracheostomy on a August 25, 2023, when she was found to have a new left true vocal cord paresis and subglottic mucus plug resulting in respiratory distress and altered mental status necessitating eventual intubation.  Patient does have follow-up with Dr. Pérez is coming spring.  Patient is tolerating regular diet at this time.  Of note the patient does continue to smoke but this is not on a daily basis.    11/28/23: Here today for first post-op appt after R endoscopic tplasty with tragal cartilage graft on 11/20/23. Doing well. Still having some pain in the right ear and behind the ear mostly at night. It's getting better but not resolved. Having some bloody drainage from the right ear.     1/2/24: Patient is here for  second follow up appointment. She is doing ok, but has intermittent pain in the right ear and behind the ear mostly at night. Not much more drainage. She has been using the drops, but when she does she feels like they run down her throat. She has also been having intense pain at her trach site for the last few days with some drainage.     Past Medical History:  Past Medical History:   Diagnosis Date    Allergies     Anxiety     Arthritis     Bowel obstruction     Status post exploratory laparotomy with partial colectomy for treatment; no cancer was present.  No colostomy was needed.    Depression     Hypertension      Past Surgical History:   Procedure Laterality Date    ABDOMINAL SURGERY      Status post exploratory laparotomy with partial colon resection for bowel obstruction; no cancer present and no colostomy needed.    ACDF/PCDF  2021    APPENDECTOMY      CARTILAGE GRAFT, TO EAR FROM EAR, AUTOLOGOUS Right 2023    Procedure: CARTILAGE GRAFT,TO EAR FROM EAR,AUTOLOGOUS;  Surgeon: Shashi Montano MD;  Location: [facility];  Service: ENT;  Laterality: Right;     SECTION       x 2    GALLBLADDER SURGERY      HIATAL HERNIA REPAIR      HYSTERECTOMY      TONSILLECTOMY AND ADENOIDECTOMY      TRACHEOSTOMY  2023    TYMPANOPLASTY      TYMPANOPLASTY, ENDOSCOPIC Right 2023    Procedure: TYMPANOPLASTY, ENDOSCOPIC;  Surgeon: Shashi Montano MD;  Location: [facility] OR;  Service: ENT;  Laterality: Right;  Plan for endoscopic procedure. Have operating microscope available.       Review of Systems:  Unremarkable except as mentioned above.    Current Medications:  Current Outpatient Medications   Medication Sig    albuterol (PROVENTIL/VENTOLIN HFA) 90 mcg/actuation inhaler 2 puffs every 6 (six) hours as needed.    ARIPiprazole (ABILIFY) 10 MG Tab Take 10 mg by mouth once daily.    baclofen (LIORESAL) 20 MG tablet Take 20 mg by mouth 3 (three) times daily.    clonazePAM (KLONOPIN) 1 MG tablet Take  1 mg by mouth 3 (three) times daily.    cyclobenzaprine (FLEXERIL) 10 MG tablet Take 10 mg by mouth every 8 (eight) hours as needed.    dextroamphetamine-amphetamine (ADDERALL XR) 30 MG 24 hr capsule Take 30 mg by mouth 2 (two) times daily.    diclofenac (VOLTAREN) 75 MG EC tablet Take 75 mg by mouth 2 (two) times daily as needed.    docusate sodium (COLACE) 100 MG capsule Take 100 mg by mouth 2 (two) times daily.    gabapentin (NEURONTIN) 300 MG capsule Take 300 mg by mouth every 8 (eight) hours.    ibuprofen (ADVIL,MOTRIN) 800 MG tablet Take 1 tablet (800 mg total) by mouth every 8 (eight) hours as needed for Pain.    levocetirizine (XYZAL) 5 MG tablet Take 5 mg by mouth every evening.    ofloxacin (OCUFLOX) 0.3 % ophthalmic solution Please use 4 drops in the right ear twice daily for 10 days.    OLANZapine (ZYPREXA) 5 MG tablet Take 5 mg by mouth every evening.    promethazine (PHENERGAN) 25 MG tablet Take 25 mg by mouth every 6 (six) hours as needed for Nausea.    venlafaxine (EFFEXOR) 75 MG tablet Take 75 mg by mouth 2 (two) times daily.    estrogens, conjugated, (PREMARIN) 0.625 MG tablet Take 0.625 mg by mouth once daily.    levETIRAcetam (KEPPRA) 500 MG Tab Take 500 mg by mouth 2 (two) times daily.    naproxen (NAPROSYN) 500 MG tablet Take 500 mg by mouth 2 (two) times daily.    oxyCODONE (ROXICODONE) 5 MG immediate release tablet Take 1 tablet (5 mg total) by mouth every 4 (four) hours as needed for Pain. (Patient not taking: Reported on 1/31/2024)    pantoprazole (PROTONIX) 20 MG tablet Take 1 tablet (20 mg total) by mouth once daily.    polyethylene glycol (GLYCOLAX) 17 gram/dose powder Take 17 g by mouth once daily. (Patient not taking: Reported on 7/20/2023)    SYMBICORT 160-4.5 mcg/actuation HFAA 2 puffs 2 (two) times daily.     No current facility-administered medications for this visit.        Allergies:  Review of patient's allergies indicates:   Allergen Reactions    Depakene [valproic acid]      Toradol [ketorolac]     Tramadol     Zofran [ondansetron hcl]     Lyrica [pregabalin] Nausea And Vomiting     Bad headaches        Physical Exam:  Vital signs:   Vitals:    01/31/24 1300   BP: 113/79   BP Location: Right forearm   Patient Position: Sitting   BP Method: Medium (Automatic)   Pulse: 80   Resp: 18   Temp: 98.3 °F (36.8 °C)   TempSrc: Oral   Weight: 61.7 kg (136 lb)   Height: 5' (1.524 m)     Right EAC with crusted blood in lateral EAC, removed  Still some wet gelfoam adherent to TM. Patient unable to tolerate removal with suction, so this was deferred. Concern for perforation in anterior inferior aspect of TM, but again, difficult to see with Gelfoam occluding the TM  Trach in place with PMV, some erythematous stomal tissue with granulation and scant purulence inferiorly. Exquisitely tender to palpation    Audiogram:        Assessment/Plan:  Mayte Toney is a 53 y.o. with longstanding right tympanic membrane perforation associated with a mixed hearing loss with a 20-50 decibel air bone gap without history of recurrent otorrhea.  S/p right endoscopic tplasty with tragal cartilage graft on 11/20/23. Still having some right otalgia and still has gelfoam adherent to TM today, although concerned that there still may be a remaining perforation given the appearance. Also with mild tracheitis and purulent secretions.    Plan:  - Continue floxin drops BID x 2 weeks. 5 more oxycodone pills to help her at night. Discussed taking Tylenol and ibuprofen on a schedule so she does not require any narcotics. Would be hesitant to prescribe any further narcotics after this.   - Augmentin x 7 days for tracheitis. Patient is to follow up with Dr. Boggs in March.   - RTC in 3 weeks. The packing should be mostly dissolved at that point for us to be able to see the graft and remove any remaining packing.   - Continue dry ear precautions. Continue no nose blowing.     Mariusz Early MD  [employer] Otolaryngology PGY-4  01/31/2024  5:05 PM

## 2024-02-08 ENCOUNTER — HOSPITAL ENCOUNTER (OUTPATIENT)
Dept: RADIOLOGY | Facility: HOSPITAL | Age: 54
Discharge: HOME | End: 2024-02-08
Attending: NURSE PRACTITIONER
Payer: MEDICAID

## 2024-02-08 DIAGNOSIS — H90.A11 CONDUCTIVE HEARING LOSS OF RIGHT EAR WITH RESTRICTED HEARING OF LEFT EAR: ICD-10-CM

## 2024-02-08 PROCEDURE — 70480 CT ORBIT/EAR/FOSSA W/O DYE: CPT | Mod: TC

## 2024-03-12 ENCOUNTER — OFFICE VISIT (OUTPATIENT)
Dept: OTOLARYNGOLOGY | Facility: CLINIC | Age: 54
End: 2024-03-12
Payer: MEDICAID

## 2024-03-12 VITALS
HEIGHT: 60 IN | RESPIRATION RATE: 20 BRPM | HEART RATE: 86 BPM | SYSTOLIC BLOOD PRESSURE: 110 MMHG | OXYGEN SATURATION: 99 % | DIASTOLIC BLOOD PRESSURE: 67 MMHG | WEIGHT: 137 LBS | BODY MASS INDEX: 26.9 KG/M2

## 2024-03-12 DIAGNOSIS — H92.01 RIGHT EAR PAIN: Primary | ICD-10-CM

## 2024-03-12 PROCEDURE — 99214 OFFICE O/P EST MOD 30 MIN: CPT | Mod: PBBFAC

## 2024-03-12 NOTE — PROGRESS NOTES
Patient Name: Mayte Toney   YOB: 1970     Chief Complaint: f/u ear pain       History of Present Illness:  July 20, 2023:   52-year-old female with a history of smoking referred for evaluation of right true vocal cord paralysis.  Patient is status post ACDF/PCDF surgery in May 2021 which had been complicated by the development of hoarseness.  The patient was seen by Dr. Hellen Boggs on December 2, 2021 for evaluation of this.  At that time the patient had indicated that her voice was soft or with phonatory at that time she also felt like she needed to strain to talk and had difficulty obtaining a loud vocal volume.  Rigid videostroboscopy at that time showed paralyzed right true vocal cord but a slight height mismatch.  Left vocal cord was fully mobile.  Patient was referred for speech therapy which she undertook for 6 weeks but she did not notice any improvement in her voice.  She would not seen Dr. Boggs for follow-up.    The patient was referred here after she recently had unknown of on a bronchoscopy by her pulmonologist as part of her evaluation for shortness of breath.  He noticed the right true vocal cord paralysis and referred the patient here for further treatment and evaluation.  The patient does continue to have hoarseness where she will at times need to strain to talk and continues to have difficulty obtaining a loud vocal volume.  She does not have any coughing or choking with eating or swallowing.  In regards to her shortness of breath she notices this more when lying down at night.  Patient denies history of heartburn or indigestion.  She currently smokes 1/2 pack of cigarettes per day.  She had been a heavier smoker in the past.  She does continue to have neck and back problems and may need additional surgery.    Patient had also indicated that she does have a right tympanic membrane perforation which she sustained at the age of 3 when her brother had placed a Chalo pin in her  ear.  She had undergone what sounds to be a tympanoplasty with a skin graft.  Patient has noticed that the hearing in her right ear is diminished relative to the left.  She will experience periodic drainage from that ear he is not had any problems for the past several months.  She does try to keep the ear dry.  Left ear is without problems.  No vertigo.  She has not had any recent audiologic evaluations.    November 14, 2023:   Patient presents today for follow-up of her right tympanic membrane perforation in review of her audiogram done on October 18, 2023.  Patient has not had any problems with any pain or drainage since her last appointment.  She is keeping water out of the ear.  The audiogram showed a moderate least severe mixed hearing loss in the right ear with an air bone gap of 20 to 50 decibel air bone gap with a speech reception threshold of 55 decibels and 100% discrimination.  In the left ear she had a normal to moderate sensorineural hearing loss with speech reception threshold of 20 decibels and 100% discrimination and a type a tympanogram.  Results were discussed with the patient.  In regards to her paralysis the patient had seen Dr. Pérez in the patient did require an urgent tracheostomy on a August 25, 2023, when she was found to have a new left true vocal cord paresis and subglottic mucus plug resulting in respiratory distress and altered mental status necessitating eventual intubation.  Patient does have follow-up with Dr. Pérez is coming spring.  Patient is tolerating regular diet at this time.  Of note the patient does continue to smoke but this is not on a daily basis.    11/28/23: Here today for first post-op appt after R endoscopic tplasty with tragal cartilage graft on 11/20/23. Doing well. Still having some pain in the right ear and behind the ear mostly at night. It's getting better but not resolved. Having some bloody drainage from the right ear.     1/2/24: Patient is here for second  follow up appointment. She is doing ok, but has intermittent pain in the right ear and behind the ear mostly at night. Not much more drainage. She has been using the drops, but when she does she feels like they run down her throat. She has also been having intense pain at her trach site for the last few days with some drainage.     24: Pt states she has been continuing to have significant right otalgia, especially in the evening. States she was seen at outside [facility] earlier this week & dx with strep. Reports she was given PCN shot.     3.12.24: Patient returns with right ear pain. Sleeping in chair, but arouses to touch. Also reports decreased hearing in same ear. She falls asleep easily during this interview and ear examination.       Past Medical History:   Diagnosis Date    Allergies     Anxiety     Arthritis     Bowel obstruction     Status post exploratory laparotomy with partial colectomy for treatment; no cancer was present.  No colostomy was needed.    Depression     Hypertension      Past Surgical History:   Procedure Laterality Date    ABDOMINAL SURGERY      Status post exploratory laparotomy with partial colon resection for bowel obstruction; no cancer present and no colostomy needed.    ACDF/PCDF  2021    APPENDECTOMY      CARTILAGE GRAFT, TO EAR FROM EAR, AUTOLOGOUS Right 2023    Procedure: CARTILAGE GRAFT,TO EAR FROM EAR,AUTOLOGOUS;  Surgeon: Shashi Montano MD;  Location: [facility];  Service: ENT;  Laterality: Right;     SECTION       x 2    GALLBLADDER SURGERY      HIATAL HERNIA REPAIR      HYSTERECTOMY      TONSILLECTOMY AND ADENOIDECTOMY      TRACHEOSTOMY  2023    TYMPANOPLASTY      TYMPANOPLASTY, ENDOSCOPIC Right 2023    Procedure: TYMPANOPLASTY, ENDOSCOPIC;  Surgeon: Shashi Montano MD;  Location: [facility] OR;  Service: ENT;  Laterality: Right;  Plan for endoscopic procedure. Have operating microscope available.       Review of Systems:  Unremarkable  except as mentioned above.    Current Medications:  Current Outpatient Medications   Medication Sig    albuterol (PROVENTIL/VENTOLIN HFA) 90 mcg/actuation inhaler 2 puffs every 6 (six) hours as needed.    amoxicillin-clavulanate 875-125mg (AUGMENTIN) 875-125 mg per tablet Take 1 tablet by mouth every 12 (twelve) hours. for 7 days    ARIPiprazole (ABILIFY) 10 MG Tab Take 10 mg by mouth once daily.    baclofen (LIORESAL) 20 MG tablet Take 20 mg by mouth 3 (three) times daily.    clonazePAM (KLONOPIN) 1 MG tablet Take 1 mg by mouth 3 (three) times daily.    cyclobenzaprine (FLEXERIL) 10 MG tablet Take 10 mg by mouth every 8 (eight) hours as needed.    dextroamphetamine-amphetamine (ADDERALL XR) 30 MG 24 hr capsule Take 30 mg by mouth 2 (two) times daily.    diclofenac (VOLTAREN) 75 MG EC tablet Take 75 mg by mouth 2 (two) times daily as needed.    docusate sodium (COLACE) 100 MG capsule Take 100 mg by mouth 2 (two) times daily.    estrogens, conjugated, (PREMARIN) 0.625 MG tablet Take 0.625 mg by mouth once daily.    gabapentin (NEURONTIN) 300 MG capsule Take 300 mg by mouth every 8 (eight) hours.    ibuprofen (ADVIL,MOTRIN) 800 MG tablet Take 1 tablet (800 mg total) by mouth every 8 (eight) hours as needed for Pain.    levETIRAcetam (KEPPRA) 500 MG Tab Take 500 mg by mouth 2 (two) times daily.    levocetirizine (XYZAL) 5 MG tablet Take 5 mg by mouth every evening.    naproxen (NAPROSYN) 500 MG tablet Take 500 mg by mouth 2 (two) times daily.    ofloxacin (OCUFLOX) 0.3 % ophthalmic solution Please use 4 drops in the right ear twice daily for 10 days.    OLANZapine (ZYPREXA) 5 MG tablet Take 5 mg by mouth every evening.    pantoprazole (PROTONIX) 20 MG tablet Take 1 tablet (20 mg total) by mouth once daily.    polyethylene glycol (GLYCOLAX) 17 gram/dose powder Take 17 g by mouth once daily. (Patient not taking: Reported on 7/20/2023)    promethazine (PHENERGAN) 25 MG tablet Take 25 mg by mouth every 6 (six) hours as  needed for Nausea.    SYMBICORT 160-4.5 mcg/actuation HFAA 2 puffs 2 (two) times daily.    venlafaxine (EFFEXOR) 75 MG tablet Take 75 mg by mouth 2 (two) times daily.     No current facility-administered medications for this visit.        Allergies:  Review of patient's allergies indicates:   Allergen Reactions    Depakene [valproic acid]     Toradol [ketorolac]     Tramadol     Zofran [ondansetron hcl]     Lyrica [pregabalin] Nausea And Vomiting     Bad headaches        Physical Exam:  Vital signs:   Vitals:    01/02/24 1342   BP: 131/76   BP Location: Left arm   Patient Position: Sitting   BP Method: Medium (Automatic)   Pulse: 76   Resp: 20   Temp: 97.3 °F (36.3 °C)   TempSrc: Oral   SpO2: 98%   Weight: 59 kg (130 lb)   Height: 5' (1.524 m)     Right EAC without erythema, or warmth, non-tender. Cerumen and small amt of dried blood within canal.   OC/OP: No lesions/masses, no purulence.  Nose: No rhinorrhea.    Audiogram:        Assessment/Plan:  Mayte Toney is a 53 y.o. with longstanding right tympanic membrane perforation associated with a mixed hearing loss with a 20-50 decibel air bone gap without history of recurrent otorrhea.  S/p right endoscopic tplasty with tragal cartilage graft on 11/20/23. Still having some right otalgia not explained by exam findings. Do not believe this to be mastoiditis given physical exam findings.    Plan:  - Reviewed CT Temporal bone with Dr. Sierra. Agree with Radiology interpretation.   - Repeat audiogram.  - Patient has follow up with Dr. Boggs 3.15.24.   - RTC 2-3 months.      Paul Jones MD, MPH  [facility]

## 2024-06-12 ENCOUNTER — OFFICE VISIT (OUTPATIENT)
Dept: OTOLARYNGOLOGY | Facility: CLINIC | Age: 54
End: 2024-06-12
Payer: MEDICAID

## 2024-06-12 VITALS — DIASTOLIC BLOOD PRESSURE: 87 MMHG | HEART RATE: 85 BPM | SYSTOLIC BLOOD PRESSURE: 129 MMHG | TEMPERATURE: 98 F

## 2024-06-12 DIAGNOSIS — H92.01 RIGHT EAR PAIN: ICD-10-CM

## 2024-06-12 DIAGNOSIS — Z93.0 TRACHEOSTOMY DEPENDENT: ICD-10-CM

## 2024-06-12 DIAGNOSIS — J38.01 PARESIS OF RIGHT VOCAL CORD: ICD-10-CM

## 2024-06-12 DIAGNOSIS — H90.A11 CONDUCTIVE HEARING LOSS OF RIGHT EAR WITH RESTRICTED HEARING OF LEFT EAR: ICD-10-CM

## 2024-06-12 DIAGNOSIS — Z98.890 HISTORY OF TYMPANOPLASTY OF RIGHT EAR: Primary | ICD-10-CM

## 2024-06-12 DIAGNOSIS — H61.21 IMPACTED CERUMEN OF RIGHT EAR: ICD-10-CM

## 2024-06-12 DIAGNOSIS — H90.A22 SENSORINEURAL HEARING LOSS (SNHL) OF LEFT EAR WITH RESTRICTED HEARING OF RIGHT EAR: ICD-10-CM

## 2024-06-12 PROCEDURE — 3079F DIAST BP 80-89 MM HG: CPT | Mod: CPTII,,, | Performed by: NURSE PRACTITIONER

## 2024-06-12 PROCEDURE — 69210 REMOVE IMPACTED EAR WAX UNI: CPT | Mod: PBBFAC | Performed by: NURSE PRACTITIONER

## 2024-06-12 PROCEDURE — 69210 REMOVE IMPACTED EAR WAX UNI: CPT | Mod: S$PBB,,, | Performed by: NURSE PRACTITIONER

## 2024-06-12 PROCEDURE — 99214 OFFICE O/P EST MOD 30 MIN: CPT | Mod: 25,S$PBB,, | Performed by: NURSE PRACTITIONER

## 2024-06-12 PROCEDURE — 1159F MED LIST DOCD IN RCRD: CPT | Mod: CPTII,,, | Performed by: NURSE PRACTITIONER

## 2024-06-12 PROCEDURE — 3074F SYST BP LT 130 MM HG: CPT | Mod: CPTII,,, | Performed by: NURSE PRACTITIONER

## 2024-06-12 PROCEDURE — 99215 OFFICE O/P EST HI 40 MIN: CPT | Mod: PBBFAC | Performed by: NURSE PRACTITIONER

## 2024-06-12 PROCEDURE — 92504 EAR MICROSCOPY EXAMINATION: CPT | Mod: PBBFAC | Performed by: NURSE PRACTITIONER

## 2024-06-12 RX ORDER — FLUTICASONE PROPIONATE 50 MCG
2 SPRAY, SUSPENSION (ML) NASAL
COMMUNITY
Start: 2024-04-03

## 2024-06-12 RX ORDER — LIDOCAINE 50 MG/G
PATCH TOPICAL
COMMUNITY
Start: 2024-01-19

## 2024-06-12 RX ORDER — CYCLOBENZAPRINE HCL 10 MG
10 TABLET ORAL 3 TIMES DAILY PRN
COMMUNITY

## 2024-06-12 RX ORDER — HYDROCODONE BITARTRATE AND ACETAMINOPHEN 10; 325 MG/1; MG/1
1 TABLET ORAL EVERY 6 HOURS PRN
COMMUNITY
Start: 2024-05-15

## 2024-06-12 RX ORDER — HYDROCHLOROTHIAZIDE 12.5 MG/1
1 TABLET ORAL EVERY MORNING
COMMUNITY
Start: 2023-08-09

## 2024-06-12 RX ORDER — PREGABALIN 100 MG/1
100 CAPSULE ORAL
COMMUNITY
Start: 2024-01-18

## 2024-06-12 RX ORDER — HYDROCODONE BITARTRATE AND ACETAMINOPHEN 7.5; 325 MG/1; MG/1
1 TABLET ORAL EVERY 6 HOURS PRN
COMMUNITY
Start: 2023-08-15

## 2024-06-12 RX ORDER — VENLAFAXINE HYDROCHLORIDE 37.5 MG/1
1 CAPSULE, EXTENDED RELEASE ORAL EVERY MORNING
COMMUNITY
Start: 2023-10-04

## 2024-06-12 RX ORDER — LEVOTHYROXINE SODIUM 75 UG/1
1 TABLET ORAL EVERY MORNING
COMMUNITY
Start: 2023-08-02

## 2024-06-12 RX ORDER — DOXEPIN HYDROCHLORIDE 25 MG/1
1 CAPSULE ORAL NIGHTLY
COMMUNITY
Start: 2023-08-09

## 2024-06-12 RX ORDER — AZELASTINE 1 MG/ML
SPRAY, METERED NASAL
COMMUNITY
Start: 2024-02-20

## 2024-06-12 RX ORDER — ALPRAZOLAM 1 MG/1
1 TABLET ORAL 3 TIMES DAILY PRN
COMMUNITY
Start: 2023-07-21

## 2024-06-12 RX ORDER — OMEPRAZOLE 20 MG/1
1 CAPSULE, DELAYED RELEASE ORAL EVERY MORNING
COMMUNITY
Start: 2024-04-04 | End: 2025-04-04

## 2024-06-12 RX ORDER — DEXAMETHASONE 4 MG/1
TABLET ORAL
COMMUNITY
Start: 2024-04-11

## 2024-06-12 RX ORDER — METHOCARBAMOL 500 MG/1
500 TABLET, FILM COATED ORAL
COMMUNITY
Start: 2023-08-09

## 2024-06-12 RX ORDER — MELOXICAM 7.5 MG/1
1 TABLET ORAL DAILY
COMMUNITY

## 2024-06-12 RX ORDER — TIZANIDINE 4 MG/1
4 TABLET ORAL EVERY 6 HOURS PRN
COMMUNITY
Start: 2023-08-09

## 2024-06-12 NOTE — PROGRESS NOTES
Patient Name: Mayte Toney   YOB: 1970     Chief Complaint: f/u ear       History of Present Illness:  July 20, 2023: 52-year-old female with a history of smoking referred for evaluation of right true vocal cord paralysis.  Patient is status post ACDF/PCDF surgery in May 2021 which had been complicated by the development of hoarseness.  The patient was seen by Dr. Hellen Boggs on December 2, 2021 for evaluation of this.  At that time the patient had indicated that her voice was soft or with phonatory at that time she also felt like she needed to strain to talk and had difficulty obtaining a loud vocal volume.  Rigid videostroboscopy at that time showed paralyzed right true vocal cord but a slight height mismatch.  Left vocal cord was fully mobile.  Patient was referred for speech therapy which she undertook for 6 weeks but she did not notice any improvement in her voice.  She would not seen Dr. Boggs for follow-up.    The patient was referred here after she recently had unknown of on a bronchoscopy by her pulmonologist as part of her evaluation for shortness of breath.  He noticed the right true vocal cord paralysis and referred the patient here for further treatment and evaluation.  The patient does continue to have hoarseness where she will at times need to strain to talk and continues to have difficulty obtaining a loud vocal volume.  She does not have any coughing or choking with eating or swallowing.  In regards to her shortness of breath she notices this more when lying down at night.  Patient denies history of heartburn or indigestion.  She currently smokes 1/2 pack of cigarettes per day.  She had been a heavier smoker in the past.  She does continue to have neck and back problems and may need additional surgery.    Patient had also indicated that she does have a right tympanic membrane perforation which she sustained at the age of 3 when her brother had placed a Chalo pin in her ear.  She  had undergone what sounds to be a tympanoplasty with a skin graft.  Patient has noticed that the hearing in her right ear is diminished relative to the left.  She will experience periodic drainage from that ear he is not had any problems for the past several months.  She does try to keep the ear dry.  Left ear is without problems.  No vertigo.  She has not had any recent audiologic evaluations.    November 14, 2023:   Patient presents today for follow-up of her right tympanic membrane perforation in review of her audiogram done on October 18, 2023.  Patient has not had any problems with any pain or drainage since her last appointment.  She is keeping water out of the ear.  The audiogram showed a moderate least severe mixed hearing loss in the right ear with an air bone gap of 20 to 50 decibel air bone gap with a speech reception threshold of 55 decibels and 100% discrimination.  In the left ear she had a normal to moderate sensorineural hearing loss with speech reception threshold of 20 decibels and 100% discrimination and a type a tympanogram.  Results were discussed with the patient.  In regards to her paralysis the patient had seen Dr. Pérez in the patient did require an urgent tracheostomy on a August 25, 2023, when she was found to have a new left true vocal cord paresis and subglottic mucus plug resulting in respiratory distress and altered mental status necessitating eventual intubation.  Patient does have follow-up with Dr. Pérez is coming spring.  Patient is tolerating regular diet at this time.  Of note the patient does continue to smoke but this is not on a daily basis.    11/28/23: Here today for first post-op appt after R endoscopic tplasty with tragal cartilage graft on 11/20/23. Doing well. Still having some pain in the right ear and behind the ear mostly at night. It's getting better but not resolved. Having some bloody drainage from the right ear.     1/2/24: Patient is here for second follow up  appointment. She is doing ok, but has intermittent pain in the right ear and behind the ear mostly at night. Not much more drainage. She has been using the drops, but when she does she feels like they run down her throat. She has also been having intense pain at her trach site for the last few days with some drainage.     24: Pt states she has been continuing to have significant right otalgia, especially in the evening. States she was seen at outside [facility] earlier this week & dx with strep. Reports she was given PCN shot.     24: Returns following CT IAC. Denies any improvement in right otalgia, stating she has deep pain and posterior auricular pain. Saw Dr. Boggs on Friday, stating she told her scope was normal apart from dryness. She is concerned about noises coming from her trach that awaken her at night. Plans to follow here for trach changes going forward. States she is frustrated because Dr. Boggs told her trach is permanent and she will just need Q 6mo trach changes. Also states she is going through a lot, that she buried her father last week.    Past Medical History:  Past Medical History:   Diagnosis Date    Allergies     Anxiety     Arthritis     Bowel obstruction     Status post exploratory laparotomy with partial colectomy for treatment; no cancer was present.  No colostomy was needed.    Depression     Hypertension      Past Surgical History:   Procedure Laterality Date    ABDOMINAL SURGERY      Status post exploratory laparotomy with partial colon resection for bowel obstruction; no cancer present and no colostomy needed.    ACDF/PCDF  2021    APPENDECTOMY      CARTILAGE GRAFT, TO EAR FROM EAR, AUTOLOGOUS Right 2023    Procedure: CARTILAGE GRAFT,TO EAR FROM EAR,AUTOLOGOUS;  Surgeon: Shashi Montano MD;  Location: [facility];  Service: ENT;  Laterality: Right;     SECTION       x 2    GALLBLADDER SURGERY      HIATAL HERNIA REPAIR      HYSTERECTOMY      TONSILLECTOMY  AND ADENOIDECTOMY      TRACHEOSTOMY  08/25/2023    TYMPANOPLASTY      TYMPANOPLASTY, ENDOSCOPIC Right 11/20/2023    Procedure: TYMPANOPLASTY, ENDOSCOPIC;  Surgeon: Shashi Montano MD;  Location: [facility];  Service: ENT;  Laterality: Right;  Plan for endoscopic procedure. Have operating microscope available.       Review of Systems:  Unremarkable except as mentioned above.    Current Medications:  Current Outpatient Medications   Medication Sig    albuterol (PROVENTIL/VENTOLIN HFA) 90 mcg/actuation inhaler 2 puffs every 6 (six) hours as needed.    amoxicillin-clavulanate 875-125mg (AUGMENTIN) 875-125 mg per tablet Take 1 tablet by mouth every 12 (twelve) hours. for 7 days    ARIPiprazole (ABILIFY) 10 MG Tab Take 10 mg by mouth once daily.    baclofen (LIORESAL) 20 MG tablet Take 20 mg by mouth 3 (three) times daily.    clonazePAM (KLONOPIN) 1 MG tablet Take 1 mg by mouth 3 (three) times daily.    cyclobenzaprine (FLEXERIL) 10 MG tablet Take 10 mg by mouth every 8 (eight) hours as needed.    dextroamphetamine-amphetamine (ADDERALL XR) 30 MG 24 hr capsule Take 30 mg by mouth 2 (two) times daily.    diclofenac (VOLTAREN) 75 MG EC tablet Take 75 mg by mouth 2 (two) times daily as needed.    docusate sodium (COLACE) 100 MG capsule Take 100 mg by mouth 2 (two) times daily.    estrogens, conjugated, (PREMARIN) 0.625 MG tablet Take 0.625 mg by mouth once daily.    gabapentin (NEURONTIN) 300 MG capsule Take 300 mg by mouth every 8 (eight) hours.    ibuprofen (ADVIL,MOTRIN) 800 MG tablet Take 1 tablet (800 mg total) by mouth every 8 (eight) hours as needed for Pain.    levETIRAcetam (KEPPRA) 500 MG Tab Take 500 mg by mouth 2 (two) times daily.    levocetirizine (XYZAL) 5 MG tablet Take 5 mg by mouth every evening.    naproxen (NAPROSYN) 500 MG tablet Take 500 mg by mouth 2 (two) times daily.    ofloxacin (OCUFLOX) 0.3 % ophthalmic solution Please use 4 drops in the right ear twice daily for 10 days.    OLANZapine (ZYPREXA) 5  MG tablet Take 5 mg by mouth every evening.    pantoprazole (PROTONIX) 20 MG tablet Take 1 tablet (20 mg total) by mouth once daily.    polyethylene glycol (GLYCOLAX) 17 gram/dose powder Take 17 g by mouth once daily. (Patient not taking: Reported on 7/20/2023)    promethazine (PHENERGAN) 25 MG tablet Take 25 mg by mouth every 6 (six) hours as needed for Nausea.    SYMBICORT 160-4.5 mcg/actuation HFAA 2 puffs 2 (two) times daily.    venlafaxine (EFFEXOR) 75 MG tablet Take 75 mg by mouth 2 (two) times daily.     No current facility-administered medications for this visit.        Allergies:  Review of patient's allergies indicates:   Allergen Reactions    Depakene [valproic acid]     Toradol [ketorolac]     Tramadol     Zofran [ondansetron hcl]     Lyrica [pregabalin] Nausea And Vomiting     Bad headaches        Physical Exam:  Vital signs:   Vitals:    01/02/24 1342   BP: 131/76   BP Location: Left arm   Patient Position: Sitting   BP Method: Medium (Automatic)   Pulse: 76   Resp: 20   Temp: 97.3 °F (36.3 °C)   TempSrc: Oral   SpO2: 98%   Weight: 59 kg (130 lb)   Height: 5' (1.524 m)     General:  Well-developed well-nourished female in no acute distress.  Voicing well with PMV.  Head and face:  Normocephalic.  No facial lesions.  + right TMJ TTP  Ears: Externally normal with grossly normal hearing.  Right ear-auricle is normally developed.  External auditory canal is primarily clear but there is some dried wax adherent to canal- attempted removal under microscopy with curette & alligator but had to d/c 2/2 c/o pain.  Tympanic membrane is thick, non erythematous, without any perforation visualized. No drainage.   Left ear-auricle is normally developed.  External auditory canal is clear.  Tympanic membrane is nonerythematous.   Nose:  Nasal dorsum unremarkable.  Mild congestion with clear drainage.    Neck:  Supple without adenopathy or thyromegaly.  Trachea is in the midline.    Eyes:  Extraocular muscles intact.  No  nystagmus.  No exophthalmos or enophthalmos.  Neurologic:  Alert and oriented.  Cranial nerves 2-12 are grossly normal.    Audiogram:      Recent data from [facility]:         Assessment/Plan:  53 y.o. with longstanding right tympanic membrane perforation associated with a mixed hearing loss with a 20-50 decibel air bone gap without history of recurrent otorrhea.  S/p right endoscopic tplasty with tragal cartilage graft on 11/20/23. Still with right otalgia not explained by exam findings. CT IAC with intact graft which is thickened, and some opacified right mastoid cells, unremarkable for etiology of pain- reviewed with Hernandez Burch & Charmaine.   - Floxin drops BID.   - Discussed rotating Tylenol and ibuprofen on a schedule so she does not require any narcotics  - Flexeril QHS prn  - Audio  - Continue dry ear precautions  - RTC 1mo on Res template  - RTC 6mo for trach change    Pallavi Tavarez NP

## 2024-07-11 ENCOUNTER — OFFICE VISIT (OUTPATIENT)
Dept: OTOLARYNGOLOGY | Facility: CLINIC | Age: 54
End: 2024-07-11
Payer: MEDICAID

## 2024-07-11 ENCOUNTER — CLINICAL SUPPORT (OUTPATIENT)
Dept: AUDIOLOGY | Facility: HOSPITAL | Age: 54
End: 2024-07-11
Payer: MEDICAID

## 2024-07-11 VITALS
HEIGHT: 60 IN | SYSTOLIC BLOOD PRESSURE: 122 MMHG | WEIGHT: 136.88 LBS | TEMPERATURE: 97 F | RESPIRATION RATE: 18 BRPM | OXYGEN SATURATION: 99 % | BODY MASS INDEX: 26.87 KG/M2 | HEART RATE: 88 BPM | DIASTOLIC BLOOD PRESSURE: 67 MMHG

## 2024-07-11 DIAGNOSIS — Z98.890 HISTORY OF TYMPANOPLASTY OF RIGHT EAR: Primary | ICD-10-CM

## 2024-07-11 DIAGNOSIS — H90.A22 SENSORINEURAL HEARING LOSS (SNHL) OF LEFT EAR WITH RESTRICTED HEARING OF RIGHT EAR: ICD-10-CM

## 2024-07-11 DIAGNOSIS — H90.A11 CONDUCTIVE HEARING LOSS OF RIGHT EAR WITH RESTRICTED HEARING OF LEFT EAR: ICD-10-CM

## 2024-07-11 DIAGNOSIS — H92.01 RIGHT EAR PAIN: ICD-10-CM

## 2024-07-11 DIAGNOSIS — H90.A31 MIXED CONDUCTIVE AND SENSORINEURAL HEARING LOSS OF RIGHT EAR WITH RESTRICTED HEARING OF LEFT EAR: Primary | ICD-10-CM

## 2024-07-11 DIAGNOSIS — Z98.890 HISTORY OF TYMPANOPLASTY OF RIGHT EAR: ICD-10-CM

## 2024-07-11 DIAGNOSIS — H61.21 IMPACTED CERUMEN OF RIGHT EAR: ICD-10-CM

## 2024-07-11 DIAGNOSIS — H90.A31 MIXED CONDUCTIVE AND SENSORINEURAL HEARING LOSS OF RIGHT EAR WITH RESTRICTED HEARING OF LEFT EAR: ICD-10-CM

## 2024-07-11 DIAGNOSIS — H60.90 OTITIS EXTERNA, UNSPECIFIED CHRONICITY, UNSPECIFIED LATERALITY, UNSPECIFIED TYPE: ICD-10-CM

## 2024-07-11 PROCEDURE — 99215 OFFICE O/P EST HI 40 MIN: CPT | Mod: PBBFAC,25

## 2024-07-11 PROCEDURE — 92567 TYMPANOMETRY: CPT | Performed by: AUDIOLOGIST-HEARING AID FITTER

## 2024-07-11 PROCEDURE — 92557 COMPREHENSIVE HEARING TEST: CPT | Performed by: AUDIOLOGIST-HEARING AID FITTER

## 2024-07-11 RX ORDER — AMOXICILLIN AND CLAVULANATE POTASSIUM 875; 125 MG/1; MG/1
1 TABLET, FILM COATED ORAL EVERY 12 HOURS
Qty: 20 TABLET | Refills: 0 | Status: SHIPPED | OUTPATIENT
Start: 2024-07-11 | End: 2024-07-21

## 2024-07-11 RX ORDER — OFLOXACIN 3 MG/ML
5 SOLUTION AURICULAR (OTIC) 2 TIMES DAILY
Qty: 10 ML | Refills: 0 | Status: SHIPPED | OUTPATIENT
Start: 2024-07-11 | End: 2024-07-21

## 2024-07-11 NOTE — PROGRESS NOTES
Patient Name: Mayte Toney   YOB: 1970     Chief Complaint: f/u ear       History of Present Illness:  July 20, 2023: 52-year-old female with a history of smoking referred for evaluation of right true vocal cord paralysis.  Patient is status post ACDF/PCDF surgery in May 2021 which had been complicated by the development of hoarseness.  The patient was seen by Dr. Hellen Boggs on December 2, 2021 for evaluation of this.  At that time the patient had indicated that her voice was soft or with phonatory at that time she also felt like she needed to strain to talk and had difficulty obtaining a loud vocal volume.  Rigid videostroboscopy at that time showed paralyzed right true vocal cord but a slight height mismatch.  Left vocal cord was fully mobile.  Patient was referred for speech therapy which she undertook for 6 weeks but she did not notice any improvement in her voice.  She would not seen Dr. Boggs for follow-up.    The patient was referred here after she recently had unknown of on a bronchoscopy by her pulmonologist as part of her evaluation for shortness of breath.  He noticed the right true vocal cord paralysis and referred the patient here for further treatment and evaluation.  The patient does continue to have hoarseness where she will at times need to strain to talk and continues to have difficulty obtaining a loud vocal volume.  She does not have any coughing or choking with eating or swallowing.  In regards to her shortness of breath she notices this more when lying down at night.  Patient denies history of heartburn or indigestion.  She currently smokes 1/2 pack of cigarettes per day.  She had been a heavier smoker in the past.  She does continue to have neck and back problems and may need additional surgery.    Patient had also indicated that she does have a right tympanic membrane perforation which she sustained at the age of 3 when her brother had placed a [place] pin in her ear.  She  had undergone what sounds to be a tympanoplasty with a skin graft.  Patient has noticed that the hearing in her right ear is diminished relative to the left.  She will experience periodic drainage from that ear he is not had any problems for the past several months.  She does try to keep the ear dry.  Left ear is without problems.  No vertigo.  She has not had any recent audiologic evaluations.    November 14, 2023:   Patient presents today for follow-up of her right tympanic membrane perforation in review of her audiogram done on October 18, 2023.  Patient has not had any problems with any pain or drainage since her last appointment.  She is keeping water out of the ear.  The audiogram showed a moderate least severe mixed hearing loss in the right ear with an air bone gap of 20 to 50 decibel air bone gap with a speech reception threshold of 55 decibels and 100% discrimination.  In the left ear she had a normal to moderate sensorineural hearing loss with speech reception threshold of 20 decibels and 100% discrimination and a type a tympanogram.  Results were discussed with the patient.  In regards to her paralysis the patient had seen Dr. Pérez in the patient did require an urgent tracheostomy on a August 25, 2023, when she was found to have a new left true vocal cord paresis and subglottic mucus plug resulting in respiratory distress and altered mental status necessitating eventual intubation.  Patient does have follow-up with Dr. Pérez is coming spring.  Patient is tolerating regular diet at this time.  Of note the patient does continue to smoke but this is not on a daily basis.    11/28/23: Here today for first post-op appt after R endoscopic tplasty with tragal cartilage graft on 11/20/23. Doing well. Still having some pain in the right ear and behind the ear mostly at night. It's getting better but not resolved. Having some bloody drainage from the right ear.     1/2/24: Patient is here for second follow up  appointment. She is doing ok, but has intermittent pain in the right ear and behind the ear mostly at night. Not much more drainage. She has been using the drops, but when she does she feels like they run down her throat. She has also been having intense pain at her trach site for the last few days with some drainage.     1/31/24: Pt states she has been continuing to have significant right otalgia, especially in the evening. [place] [facility] [place] with [place]. Reports she was given PCN shot.     6/12/24: Returns following CT IAC. Denies any improvement in right otalgia, stating she has deep pain and posterior auricular pain. Saw Dr. Boggs on Friday, stating she told her scope was normal apart from dryness. She is concerned about noises coming from her trach that awaken her at night. Plans to follow here for trach changes going forward. States she is frustrated because Dr. Boggs told her trach is permanent and she will just need Q 6mo trach changes. Also states she is going through a lot, that she buried her father last week.    7/11/24:  Returns for follow-up following audiogram.  Patient reports that she has been doing well since last visit but does complain occasional right-sided otalgia and otorrhea.  She has not had any recently though she does feel like her right ear is tender to palpation.  She underwent tympanoplasty in November of 2023 reports she has had some hearing improvement since then.  She reports no issues with her trach and that she recently saw Dr. Pérez who wants to see her every 6 months for trach changes.    Past Medical History:  Past Medical History:   Diagnosis Date    Allergies     Anxiety     Arthritis     Bowel obstruction     Status post exploratory laparotomy with partial colectomy for treatment; no cancer was present.  No colostomy was needed.    Depression     Hypertension      Past Surgical History:   Procedure Laterality Date    ABDOMINAL SURGERY       Status post exploratory laparotomy with partial colon resection for bowel obstruction; no cancer present and no colostomy needed.    ACDF/PCDF  2021    APPENDECTOMY      CARTILAGE GRAFT, TO EAR FROM EAR, AUTOLOGOUS Right 2023    Procedure: CARTILAGE GRAFT,TO EAR FROM EAR,AUTOLOGOUS;  Surgeon: Shashi Montano MD;  Location: [facility] OR;  Service: ENT;  Laterality: Right;     SECTION       x 2    GALLBLADDER SURGERY      HIATAL HERNIA REPAIR      HYSTERECTOMY      TONSILLECTOMY AND ADENOIDECTOMY      TRACHEOSTOMY  2023    TYMPANOPLASTY      TYMPANOPLASTY, ENDOSCOPIC Right 2023    Procedure: TYMPANOPLASTY, ENDOSCOPIC;  Surgeon: Shashi Montano MD;  Location: [facility] OR;  Service: ENT;  Laterality: Right;  Plan for endoscopic procedure. Have operating microscope available.       Review of Systems:  Unremarkable except as mentioned above.    Current Medications:  Current Outpatient Medications   Medication Sig    albuterol (PROVENTIL/VENTOLIN HFA) 90 mcg/actuation inhaler 2 puffs every 6 (six) hours as needed.    amoxicillin-clavulanate 875-125mg (AUGMENTIN) 875-125 mg per tablet Take 1 tablet by mouth every 12 (twelve) hours. for 7 days    ARIPiprazole (ABILIFY) 10 MG Tab Take 10 mg by mouth once daily.    baclofen (LIORESAL) 20 MG tablet Take 20 mg by mouth 3 (three) times daily.    clonazePAM (KLONOPIN) 1 MG tablet Take 1 mg by mouth 3 (three) times daily.    cyclobenzaprine (FLEXERIL) 10 MG tablet Take 10 mg by mouth every 8 (eight) hours as needed.    dextroamphetamine-amphetamine (ADDERALL XR) 30 MG 24 hr capsule Take 30 mg by mouth 2 (two) times daily.    diclofenac (VOLTAREN) 75 MG EC tablet Take 75 mg by mouth 2 (two) times daily as needed.    docusate sodium (COLACE) 100 MG capsule Take 100 mg by mouth 2 (two) times daily.    estrogens, conjugated, (PREMARIN) 0.625 MG tablet Take 0.625 mg by mouth once daily.    gabapentin (NEURONTIN) 300 MG capsule Take 300 mg by mouth  every 8 (eight) hours.    ibuprofen (ADVIL,MOTRIN) 800 MG tablet Take 1 tablet (800 mg total) by mouth every 8 (eight) hours as needed for Pain.    levETIRAcetam (KEPPRA) 500 MG Tab Take 500 mg by mouth 2 (two) times daily.    levocetirizine (XYZAL) 5 MG tablet Take 5 mg by mouth every evening.    naproxen (NAPROSYN) 500 MG tablet Take 500 mg by mouth 2 (two) times daily.    ofloxacin (OCUFLOX) 0.3 % ophthalmic solution Please use 4 drops in the right ear twice daily for 10 days.    OLANZapine (ZYPREXA) 5 MG tablet Take 5 mg by mouth every evening.    pantoprazole (PROTONIX) 20 MG tablet Take 1 tablet (20 mg total) by mouth once daily.    polyethylene glycol (GLYCOLAX) 17 gram/dose powder Take 17 g by mouth once daily. (Patient not taking: Reported on 7/20/2023)    promethazine (PHENERGAN) 25 MG tablet Take 25 mg by mouth every 6 (six) hours as needed for Nausea.    SYMBICORT 160-4.5 mcg/actuation HFAA 2 puffs 2 (two) times daily.    venlafaxine (EFFEXOR) 75 MG tablet Take 75 mg by mouth 2 (two) times daily.     No current facility-administered medications for this visit.        Allergies:  Review of patient's allergies indicates:   Allergen Reactions    Depakene [valproic acid]     Toradol [ketorolac]     Tramadol     Zofran [ondansetron hcl]     Lyrica [pregabalin] Nausea And Vomiting     Bad headaches        Physical Exam:  Vital signs:   Vitals:    01/02/24 1342   BP: 131/76   BP Location: Left arm   Patient Position: Sitting   BP Method: Medium (Automatic)   Pulse: 76   Resp: 20   Temp: 97.3 °F (36.3 °C)   TempSrc: Oral   SpO2: 98%   Weight: 59 kg (130 lb)   Height: 5' (1.524 m)     General:  Well-developed well-nourished female in no acute distress.  Voicing well with PMV.  Head and face:  Normocephalic.  No facial lesions.  + right TMJ TTP  Ears: Externally normal with grossly normal hearing.  Right ear-auricle is normally developed but exquisitely tender to palpation.  EAC mostly clear but with some crusting  medially particularly the anterior superior portion of the TM, partially debrided and cleaned had to stop due to patient intolerance, no perforation noted TM appears intact Left ear-auricle is normally developed.  External auditory canal is clear.  Tympanic membrane is nonerythematous.   Nose:  Nasal dorsum unremarkable.  Mild congestion with clear drainage.    Neck:  Supple without adenopathy or thyromegaly.  Trachea is in the midline.    Eyes:  Extraocular muscles intact.  No nystagmus.  No exophthalmos or enophthalmos.  Neurologic:  Alert and oriented.  Cranial nerves 2-12 are grossly normal.    Audiogram:        Recent data from [place] :         Assessment/Plan:  53 y.o. with longstanding right tympanic membrane perforation associated with a mixed hearing loss with a 20-50 decibel air bone gap without history of recurrent otorrhea.  S/p right endoscopic tplasty with tragal cartilage graft on 11/20/23. CT IAC with intact graft which is thickened, and some opacified right mastoid cells.  She continues to have right otalgia and possible scant right otorrhea that is inconsistent with her exam findings.  Audiogram today shows improvement in low-frequency hearing on right following surgery now with type B TYMP on the right and appropriate volume.  Given pain will treat for possible ear infection although non concerning for acute infection on exam     - Restart Floxin drops BID  - Start Augmentin for 10 days  - Continue dry ear precautions  - RTC 3-4 weeks  - RTC 9/5/24 with Dr. Boggs for trach change    MANASA Kimbrough MD  [employer], PGY-III    Answers submitted by the patient for this visit:  Review of Symptoms Questionnaire  (Submitted on 7/4/2024)  Fatigue (Tiredness)?: Yes  hearing loss: Yes  ear pain: Yes  None of these : Yes  cough: Yes  None of these : Yes  None of these: Yes  None of these: Yes  Muscle aches / pain?: Yes  back pain: Yes  neck pain: Yes  None of these : Yes  None of these:  Yes  None of these : Yes  None of these: Yes  None of these: Yes  None of these: Yes

## 2024-07-11 NOTE — PROGRESS NOTES
Audiological Evaluation    Patient History:    Patient evaluated today to assess hearing.  She reportedly perceives a decrease in hearing AD only since previous evaluation in 2023.   Mrs. Toney also endorses otalgia of the right ear and a history of otologic procedures to include T-plasty.  Patient's medical history has reportedly not changed since most recent medical evaluation.       Pure Tone Testing:    Right ear:       Mild to severe, mixed HL    Left ear:          Normal to mild, SNHL        Tympanometry:      Right ear:   Type 'B' tympanogram (0.73 mL)    Left ear: Type 'A' tympanogram           Acoustic Reflex Testing    Right ear:   Did not test     Left ear: Did not test        Interpretations:    Pure tone testing revealed mild to severe, mixed hearing loss for the right ear.  Testing for the left ear revealed normal to mild, sensorineural hearing loss. Today's findings revealed significant improvement in the lower frequencies specifically for the right ear when compared to previous testing in Oct of 2023.  Speech reception thresholds were obtained at 35 dB HL (right ear) and 20 dB HL (left ear) consistent with pure tone results, bilaterally.  Word recognition scores were excellent, bilaterally.  Immittance testing revealed a Type B tympanogram for the right ear indicative of a non-mobile TM; a Type A tympanogram was noted for the left ear indicative of normal middle ear function. Otoscopy revealed clear EACs, bilaterally.      Recommendations:     Audiological testing annually and/or per ENT rec  ENT evaluation (7/11/2024)  Hearing protection when exposed to hazardous noise    Hillary Pappas.  Clinical Audiologist

## 2024-08-13 ENCOUNTER — OFFICE VISIT (OUTPATIENT)
Dept: OTOLARYNGOLOGY | Facility: CLINIC | Age: 54
End: 2024-08-13
Payer: MEDICAID

## 2024-08-13 VITALS
DIASTOLIC BLOOD PRESSURE: 78 MMHG | HEART RATE: 93 BPM | WEIGHT: 135 LBS | HEIGHT: 60 IN | OXYGEN SATURATION: 99 % | BODY MASS INDEX: 26.5 KG/M2 | SYSTOLIC BLOOD PRESSURE: 113 MMHG | TEMPERATURE: 98 F | RESPIRATION RATE: 18 BRPM

## 2024-08-13 DIAGNOSIS — H72.91 TYMPANIC MEMBRANE PERFORATION, RIGHT: ICD-10-CM

## 2024-08-13 DIAGNOSIS — J30.9 ALLERGIC RHINITIS, UNSPECIFIED SEASONALITY, UNSPECIFIED TRIGGER: Primary | ICD-10-CM

## 2024-08-13 PROCEDURE — 99215 OFFICE O/P EST HI 40 MIN: CPT | Mod: PBBFAC | Performed by: STUDENT IN AN ORGANIZED HEALTH CARE EDUCATION/TRAINING PROGRAM

## 2024-08-13 PROCEDURE — 92504 EAR MICROSCOPY EXAMINATION: CPT | Mod: PBBFAC | Performed by: STUDENT IN AN ORGANIZED HEALTH CARE EDUCATION/TRAINING PROGRAM

## 2024-08-13 NOTE — PROGRESS NOTES
Patient Name: Mayte Toney   YOB: 1970     Chief Complaint: f/u ear       History of Present Illness:  July 20, 2023: 52-year-old female with a history of smoking referred for evaluation of right true vocal cord paralysis.  Patient is status post ACDF/PCDF surgery in May 2021 which had been complicated by the development of hoarseness.  The patient was seen by Dr. Hellen Boggs on December 2, 2021 for evaluation of this.  At that time the patient had indicated that her voice was soft or with phonatory at that time she also felt like she needed to strain to talk and had difficulty obtaining a loud vocal volume.  Rigid videostroboscopy at that time showed paralyzed right true vocal cord but a slight height mismatch.  Left vocal cord was fully mobile.  Patient was referred for speech therapy which she undertook for 6 weeks but she did not notice any improvement in her voice.  She would not seen Dr. Boggs for follow-up.    The patient was referred here after she recently had unknown of on a bronchoscopy by her pulmonologist as part of her evaluation for shortness of breath.  He noticed the right true vocal cord paralysis and referred the patient here for further treatment and evaluation.  The patient does continue to have hoarseness where she will at times need to strain to talk and continues to have difficulty obtaining a loud vocal volume.  She does not have any coughing or choking with eating or swallowing.  In regards to her shortness of breath she notices this more when lying down at night.  Patient denies history of heartburn or indigestion.  She currently smokes 1/2 pack of cigarettes per day.  She had been a heavier smoker in the past.  She does continue to have neck and back problems and may need additional surgery.    Patient had also indicated that she does have a right tympanic membrane perforation which she sustained at the age of 3 when her brother had placed a Chalo pin in her ear.  She  had undergone what sounds to be a tympanoplasty with a skin graft.  Patient has noticed that the hearing in her right ear is diminished relative to the left.  She will experience periodic drainage from that ear he is not had any problems for the past several months.  She does try to keep the ear dry.  Left ear is without problems.  No vertigo.  She has not had any recent audiologic evaluations.    November 14, 2023:   Patient presents today for follow-up of her right tympanic membrane perforation in review of her audiogram done on October 18, 2023.  Patient has not had any problems with any pain or drainage since her last appointment.  She is keeping water out of the ear.  The audiogram showed a moderate least severe mixed hearing loss in the right ear with an air bone gap of 20 to 50 decibel air bone gap with a speech reception threshold of 55 decibels and 100% discrimination.  In the left ear she had a normal to moderate sensorineural hearing loss with speech reception threshold of 20 decibels and 100% discrimination and a type a tympanogram.  Results were discussed with the patient.  In regards to her paralysis the patient had seen Dr. Pérez in the patient did require an urgent tracheostomy on a August 25, 2023, when she was found to have a new left true vocal cord paresis and subglottic mucus plug resulting in respiratory distress and altered mental status necessitating eventual intubation.  Patient does have follow-up with Dr. Pérez is coming spring.  Patient is tolerating regular diet at this time.  Of note the patient does continue to smoke but this is not on a daily basis.    11/28/23: Here today for first post-op appt after R endoscopic tplasty with tragal cartilage graft on 11/20/23. Doing well. Still having some pain in the right ear and behind the ear mostly at night. It's getting better but not resolved. Having some bloody drainage from the right ear.     1/2/24: Patient is here for second follow up  appointment. She is doing ok, but has intermittent pain in the right ear and behind the ear mostly at night. Not much more drainage. She has been using the drops, but when she does she feels like they run down her throat. She has also been having intense pain at her trach site for the last few days with some drainage.     1/31/24: Pt states she has been continuing to have significant right otalgia, especially in the evening. States she was seen at outside [facility] earlier this week & dx with strep. Reports she was given PCN shot.     6/12/24: Returns following CT IAC. Denies any improvement in right otalgia, stating she has deep pain and posterior auricular pain. Saw Dr. Murillo on Friday, stating she told her scope was normal apart from dryness. She is concerned about noises coming from her trach that awaken her at night. Plans to follow here for trach changes going forward. States she is frustrated because Dr. Murillo told her trach is permanent and she will just need Q 6mo trach changes. Also states she is going through a lot, that she buried her father last week.    7/11/24:  Returns for follow-up following audiogram.  Patient reports that she has been doing well since last visit but does complain occasional right-sided otalgia and otorrhea.  She has not had any recently though she does feel like her right ear is tender to palpation.  She underwent tympanoplasty in November of 2023 reports she has had some hearing improvement since then.  She reports no issues with her trach and that she recently saw Dr. Pérez who wants to see her every 6 months for trach changes.    8/13/24: here today for follow up. Doing well but has occasional otorrhea, continued right ear pain. Seeing dr murillo in sept for trach change    Past Medical History:  Past Medical History:   Diagnosis Date    Allergies     Anxiety     Arthritis     Bowel obstruction     Status post exploratory laparotomy with partial colectomy for treatment; no cancer  was present.  No colostomy was needed.    Depression     Hypertension      Past Surgical History:   Procedure Laterality Date    ABDOMINAL SURGERY      Status post exploratory laparotomy with partial colon resection for bowel obstruction; no cancer present and no colostomy needed.    ACDF/PCDF  2021    APPENDECTOMY      CARTILAGE GRAFT, TO EAR FROM EAR, AUTOLOGOUS Right 2023    Procedure: CARTILAGE GRAFT,TO EAR FROM EAR,AUTOLOGOUS;  Surgeon: Shashi Montano MD;  Location: [facility] OR;  Service: ENT;  Laterality: Right;     SECTION       x 2    GALLBLADDER SURGERY      HIATAL HERNIA REPAIR      HYSTERECTOMY      TONSILLECTOMY AND ADENOIDECTOMY      TRACHEOSTOMY  2023    TYMPANOPLASTY      TYMPANOPLASTY, ENDOSCOPIC Right 2023    Procedure: TYMPANOPLASTY, ENDOSCOPIC;  Surgeon: Shashi Montano MD;  Location: [facility] OR;  Service: ENT;  Laterality: Right;  Plan for endoscopic procedure. Have operating microscope available.       Review of Systems:  Unremarkable except as mentioned above.    Current Medications:  Current Outpatient Medications   Medication Sig    albuterol (PROVENTIL/VENTOLIN HFA) 90 mcg/actuation inhaler 2 puffs every 6 (six) hours as needed.    amoxicillin-clavulanate 875-125mg (AUGMENTIN) 875-125 mg per tablet Take 1 tablet by mouth every 12 (twelve) hours. for 7 days    ARIPiprazole (ABILIFY) 10 MG Tab Take 10 mg by mouth once daily.    baclofen (LIORESAL) 20 MG tablet Take 20 mg by mouth 3 (three) times daily.    clonazePAM (KLONOPIN) 1 MG tablet Take 1 mg by mouth 3 (three) times daily.    cyclobenzaprine (FLEXERIL) 10 MG tablet Take 10 mg by mouth every 8 (eight) hours as needed.    dextroamphetamine-amphetamine (ADDERALL XR) 30 MG 24 hr capsule Take 30 mg by mouth 2 (two) times daily.    diclofenac (VOLTAREN) 75 MG EC tablet Take 75 mg by mouth 2 (two) times daily as needed.    docusate sodium (COLACE) 100 MG capsule Take 100 mg by mouth 2 (two) times daily.     estrogens, conjugated, (PREMARIN) 0.625 MG tablet Take 0.625 mg by mouth once daily.    gabapentin (NEURONTIN) 300 MG capsule Take 300 mg by mouth every 8 (eight) hours.    ibuprofen (ADVIL,MOTRIN) 800 MG tablet Take 1 tablet (800 mg total) by mouth every 8 (eight) hours as needed for Pain.    levETIRAcetam (KEPPRA) 500 MG Tab Take 500 mg by mouth 2 (two) times daily.    levocetirizine (XYZAL) 5 MG tablet Take 5 mg by mouth every evening.    naproxen (NAPROSYN) 500 MG tablet Take 500 mg by mouth 2 (two) times daily.    ofloxacin (OCUFLOX) 0.3 % ophthalmic solution Please use 4 drops in the right ear twice daily for 10 days.    OLANZapine (ZYPREXA) 5 MG tablet Take 5 mg by mouth every evening.    pantoprazole (PROTONIX) 20 MG tablet Take 1 tablet (20 mg total) by mouth once daily.    polyethylene glycol (GLYCOLAX) 17 gram/dose powder Take 17 g by mouth once daily. (Patient not taking: Reported on 7/20/2023)    promethazine (PHENERGAN) 25 MG tablet Take 25 mg by mouth every 6 (six) hours as needed for Nausea.    SYMBICORT 160-4.5 mcg/actuation HFAA 2 puffs 2 (two) times daily.    venlafaxine (EFFEXOR) 75 MG tablet Take 75 mg by mouth 2 (two) times daily.     No current facility-administered medications for this visit.        Allergies:  Review of patient's allergies indicates:   Allergen Reactions    Depakene [valproic acid]     Toradol [ketorolac]     Tramadol     Zofran [ondansetron hcl]     Lyrica [pregabalin] Nausea And Vomiting     Bad headaches        Physical Exam:  Vital signs:   Vitals:    01/02/24 1342   BP: 131/76   BP Location: Left arm   Patient Position: Sitting   BP Method: Medium (Automatic)   Pulse: 76   Resp: 20   Temp: 97.3 °F (36.3 °C)   TempSrc: Oral   SpO2: 98%   Weight: 59 kg (130 lb)   Height: 5' (1.524 m)     General:  Well-developed well-nourished female in no acute distress.  Voicing well with PMV.  Head and face:  Normocephalic.  No facial lesions.  + right TMJ TTP  Ears: Externally normal  with grossly normal hearing.  Right ear-auricle is normally developed but exquisitely tender to palpation.  EAC clear but sensitive. TM without obvious perforation but there is an area that is thin at the superior posterior area just above the graft.   Left ear-auricle is normally developed.  External auditory canal is clear.  Tympanic membrane is nonerythematous.   Nose:  Nasal dorsum unremarkable.  Mild congestion with clear drainage.    Neck:  Supple without adenopathy or thyromegaly.  Trachea is in the midline.    Eyes:  Extraocular muscles intact.  No nystagmus.  No exophthalmos or enophthalmos.  Neurologic:  Alert and oriented.  Cranial nerves 2-12 are grossly normal.    Audiogram:        Recent data from [place] :         Assessment/Plan:  53 y.o. with longstanding right tympanic membrane perforation associated with a mixed hearing loss with a 20-50 decibel air bone gap without history of recurrent otorrhea.  S/p right endoscopic tplasty with tragal cartilage graft on 11/20/23. Continued otorrhea. She has an audible TM perforation and EAC sensitivity but no perforation noted on microscopic exam. She does have an area of the superior posterior TM that is thin.      - flonase 2 sprays BID  - CT IAC to eval ear  - Continue dry ear precautions  - RTC 3-4 weeks same day as imaging (she will call when she schedules the CT)  - continue to follow with Dr. Boggs for trach change    MANASA Valdez MD  [facility] Otolaryngology PGY V      Answers submitted by the patient for this visit:  Review of Symptoms Questionnaire  (Submitted on 7/4/2024)  Fatigue (Tiredness)?: Yes  hearing loss: Yes  ear pain: Yes  None of these : Yes  cough: Yes  None of these : Yes  None of these: Yes  None of these: Yes  Muscle aches / pain?: Yes  back pain: Yes  neck pain: Yes  None of these : Yes  None of these: Yes  None of these : Yes  None of these: Yes  None of these: Yes  None of these: Yes

## 2024-08-27 ENCOUNTER — HOSPITAL ENCOUNTER (OUTPATIENT)
Dept: RADIOLOGY | Facility: HOSPITAL | Age: 54
Discharge: HOME | End: 2024-08-27
Attending: STUDENT IN AN ORGANIZED HEALTH CARE EDUCATION/TRAINING PROGRAM
Payer: MEDICAID

## 2024-08-27 ENCOUNTER — OFFICE VISIT (OUTPATIENT)
Dept: OTOLARYNGOLOGY | Facility: CLINIC | Age: 54
End: 2024-08-27
Attending: STUDENT IN AN ORGANIZED HEALTH CARE EDUCATION/TRAINING PROGRAM
Payer: MEDICAID

## 2024-08-27 VITALS
RESPIRATION RATE: 18 BRPM | TEMPERATURE: 98 F | HEIGHT: 60 IN | DIASTOLIC BLOOD PRESSURE: 84 MMHG | HEART RATE: 81 BPM | BODY MASS INDEX: 26.31 KG/M2 | OXYGEN SATURATION: 96 % | SYSTOLIC BLOOD PRESSURE: 145 MMHG | WEIGHT: 134 LBS

## 2024-08-27 DIAGNOSIS — H72.91 TYMPANIC MEMBRANE PERFORATION, RIGHT: ICD-10-CM

## 2024-08-27 DIAGNOSIS — H92.01 RIGHT EAR PAIN: Primary | ICD-10-CM

## 2024-08-27 DIAGNOSIS — S03.00XD DISLOCATION OF TEMPOROMANDIBULAR JOINT, SUBSEQUENT ENCOUNTER: ICD-10-CM

## 2024-08-27 PROCEDURE — 99215 OFFICE O/P EST HI 40 MIN: CPT | Mod: PBBFAC,25 | Performed by: STUDENT IN AN ORGANIZED HEALTH CARE EDUCATION/TRAINING PROGRAM

## 2024-08-27 PROCEDURE — 70480 CT ORBIT/EAR/FOSSA W/O DYE: CPT | Mod: TC

## 2024-08-27 RX ORDER — CYCLOBENZAPRINE HCL 10 MG
10 TABLET ORAL 3 TIMES DAILY PRN
Qty: 25 TABLET | Refills: 0 | Status: SHIPPED | OUTPATIENT
Start: 2024-08-27

## 2024-08-27 RX ORDER — AMOXICILLIN 500 MG/1
500 TABLET, FILM COATED ORAL 2 TIMES DAILY
COMMUNITY
Start: 2024-08-01

## 2024-08-27 NOTE — PROGRESS NOTES
Patient Name: Mayte Toney   YOB: 1970     Chief Complaint: f/u ear       History of Present Illness:  July 20, 2023: 52-year-old female with a history of smoking referred for evaluation of right true vocal cord paralysis.  Patient is status post ACDF/PCDF surgery in May 2021 which had been complicated by the development of hoarseness.  The patient was seen by Dr. Hellen Boggs on December 2, 2021 for evaluation of this.  At that time the patient had indicated that her voice was soft or with phonatory at that time she also felt like she needed to strain to talk and had difficulty obtaining a loud vocal volume.  Rigid videostroboscopy at that time showed paralyzed right true vocal cord but a slight height mismatch.  Left vocal cord was fully mobile.  Patient was referred for speech therapy which she undertook for 6 weeks but she did not notice any improvement in her voice.  She would not seen Dr. Boggs for follow-up.    The patient was referred here after she recently had unknown of on a bronchoscopy by her pulmonologist as part of her evaluation for shortness of breath.  He noticed the right true vocal cord paralysis and referred the patient here for further treatment and evaluation.  The patient does continue to have hoarseness where she will at times need to strain to talk and continues to have difficulty obtaining a loud vocal volume.  She does not have any coughing or choking with eating or swallowing.  In regards to her shortness of breath she notices this more when lying down at night.  Patient denies history of heartburn or indigestion.  She currently smokes 1/2 pack of cigarettes per day.  She had been a heavier smoker in the past.  She does continue to have neck and back problems and may need additional surgery.    Patient had also indicated that she does have a right tympanic membrane perforation which she sustained at the age of 3 when her brother had placed a Chalo pin in her ear.  She  had undergone what sounds to be a tympanoplasty with a skin graft.  Patient has noticed that the hearing in her right ear is diminished relative to the left.  She will experience periodic drainage from that ear he is not had any problems for the past several months.  She does try to keep the ear dry.  Left ear is without problems.  No vertigo.  She has not had any recent audiologic evaluations.    November 14, 2023:   Patient presents today for follow-up of her right tympanic membrane perforation in review of her audiogram done on October 18, 2023.  Patient has not had any problems with any pain or drainage since her last appointment.  She is keeping water out of the ear.  The audiogram showed a moderate least severe mixed hearing loss in the right ear with an air bone gap of 20 to 50 decibel air bone gap with a speech reception threshold of 55 decibels and 100% discrimination.  In the left ear she had a normal to moderate sensorineural hearing loss with speech reception threshold of 20 decibels and 100% discrimination and a type a tympanogram.  Results were discussed with the patient.  In regards to her paralysis the patient had seen Dr. Pérez in the patient did require an urgent tracheostomy on a August 25, 2023, when she was found to have a new left true vocal cord paresis and subglottic mucus plug resulting in respiratory distress and altered mental status necessitating eventual intubation.  Patient does have follow-up with Dr. Pérez is coming spring.  Patient is tolerating regular diet at this time.  Of note the patient does continue to smoke but this is not on a daily basis.    11/28/23: Here today for first post-op appt after R endoscopic tplasty with tragal cartilage graft on 11/20/23. Doing well. Still having some pain in the right ear and behind the ear mostly at night. It's getting better but not resolved. Having some bloody drainage from the right ear.     1/2/24: Patient is here for second follow up  appointment. She is doing ok, but has intermittent pain in the right ear and behind the ear mostly at night. Not much more drainage. She has been using the drops, but when she does she feels like they run down her throat. She has also been having intense pain at her trach site for the last few days with some drainage.     1/31/24: Pt states she has been continuing to have significant right otalgia, especially in the evening. States she was [place] [facility] [place] & dx with strep. Reports she was given PCN shot.     6/12/24: Returns following CT IAC. Denies any improvement in right otalgia, stating she has deep pain and posterior auricular pain. Saw Dr. Murillo on Friday, stating she told her scope was normal apart from dryness. She is concerned about noises coming from her trach that awaken her at night. Plans to follow here for trach changes going forward. States she is frustrated because Dr. Murillo told her trach is permanent and she will just need Q 6mo trach changes. Also states she is going through a lot, that she buried her father last week.    7/11/24:  Returns for follow-up following audiogram.  Patient reports that she has been doing well since last visit but does complain occasional right-sided otalgia and otorrhea.  She has not had any recently though she does feel like her right ear is tender to palpation.  She underwent tympanoplasty in November of 2023 reports she has had some hearing improvement since then.  She reports no issues with her trach and that she recently saw Dr. Pérez who wants to see her every 6 months for trach changes.    8/27/24: here today for follow up. Doing well but has occasional otorrhea, continued right ear pain. Seeing dr murillo in sept for trach change. Her ear has not been draining for the past 3 or 4 days however she has mastoid tenderness.  She had an oral surgery for dental extractions about a week ago in his currently on Augmentin  Here for follow-up.   Patient got CT IAC which showed thickening of TM and some fluid in mastoid.     Past Medical History:  Past Medical History:   Diagnosis Date    Allergies     Anxiety     Arthritis     Bowel obstruction     Status post exploratory laparotomy with partial colectomy for treatment; no cancer was present.  No colostomy was needed.    Depression     Hypertension      Past Surgical History:   Procedure Laterality Date    ABDOMINAL SURGERY      Status post exploratory laparotomy with partial colon resection for bowel obstruction; no cancer present and no colostomy needed.    ACDF/PCDF  2021    APPENDECTOMY      CARTILAGE GRAFT, TO EAR FROM EAR, AUTOLOGOUS Right 2023    Procedure: CARTILAGE GRAFT,TO EAR FROM EAR,AUTOLOGOUS;  Surgeon: Shashi Montano MD;  Location: [facility] OR;  Service: ENT;  Laterality: Right;     SECTION       x 2    GALLBLADDER SURGERY      HIATAL HERNIA REPAIR      HYSTERECTOMY      TONSILLECTOMY AND ADENOIDECTOMY      TRACHEOSTOMY  2023    TYMPANOPLASTY      TYMPANOPLASTY, ENDOSCOPIC Right 2023    Procedure: TYMPANOPLASTY, ENDOSCOPIC;  Surgeon: Shashi Montano MD;  Location: [facility] OR;  Service: ENT;  Laterality: Right;  Plan for endoscopic procedure. Have operating microscope available.       Review of Systems:  Unremarkable except as mentioned above.    Current Medications:  Current Outpatient Medications   Medication Sig    albuterol (PROVENTIL/VENTOLIN HFA) 90 mcg/actuation inhaler 2 puffs every 6 (six) hours as needed.    amoxicillin-clavulanate 875-125mg (AUGMENTIN) 875-125 mg per tablet Take 1 tablet by mouth every 12 (twelve) hours. for 7 days    ARIPiprazole (ABILIFY) 10 MG Tab Take 10 mg by mouth once daily.    baclofen (LIORESAL) 20 MG tablet Take 20 mg by mouth 3 (three) times daily.    clonazePAM (KLONOPIN) 1 MG tablet Take 1 mg by mouth 3 (three) times daily.    cyclobenzaprine (FLEXERIL) 10 MG tablet Take 10 mg by mouth every 8 (eight) hours as  needed.    dextroamphetamine-amphetamine (ADDERALL XR) 30 MG 24 hr capsule Take 30 mg by mouth 2 (two) times daily.    diclofenac (VOLTAREN) 75 MG EC tablet Take 75 mg by mouth 2 (two) times daily as needed.    docusate sodium (COLACE) 100 MG capsule Take 100 mg by mouth 2 (two) times daily.    estrogens, conjugated, (PREMARIN) 0.625 MG tablet Take 0.625 mg by mouth once daily.    gabapentin (NEURONTIN) 300 MG capsule Take 300 mg by mouth every 8 (eight) hours.    ibuprofen (ADVIL,MOTRIN) 800 MG tablet Take 1 tablet (800 mg total) by mouth every 8 (eight) hours as needed for Pain.    levETIRAcetam (KEPPRA) 500 MG Tab Take 500 mg by mouth 2 (two) times daily.    levocetirizine (XYZAL) 5 MG tablet Take 5 mg by mouth every evening.    naproxen (NAPROSYN) 500 MG tablet Take 500 mg by mouth 2 (two) times daily.    ofloxacin (OCUFLOX) 0.3 % ophthalmic solution Please use 4 drops in the right ear twice daily for 10 days.    OLANZapine (ZYPREXA) 5 MG tablet Take 5 mg by mouth every evening.    pantoprazole (PROTONIX) 20 MG tablet Take 1 tablet (20 mg total) by mouth once daily.    polyethylene glycol (GLYCOLAX) 17 gram/dose powder Take 17 g by mouth once daily. (Patient not taking: Reported on 7/20/2023)    promethazine (PHENERGAN) 25 MG tablet Take 25 mg by mouth every 6 (six) hours as needed for Nausea.    SYMBICORT 160-4.5 mcg/actuation HFAA 2 puffs 2 (two) times daily.    venlafaxine (EFFEXOR) 75 MG tablet Take 75 mg by mouth 2 (two) times daily.     No current facility-administered medications for this visit.        Allergies:  Review of patient's allergies indicates:   Allergen Reactions    Depakene [valproic acid]     Toradol [ketorolac]     Tramadol     Zofran [ondansetron hcl]     Lyrica [pregabalin] Nausea And Vomiting     Bad headaches        Physical Exam:  Vital signs:   Vitals:    01/02/24 1342   BP: 131/76   BP Location: Left arm   Patient Position: Sitting   BP Method: Medium (Automatic)   Pulse: 76   Resp:  20   Temp: 97.3 °F (36.3 °C)   TempSrc: Oral   SpO2: 98%   Weight: 59 kg (130 lb)   Height: 5' (1.524 m)     General:  Well-developed well-nourished female in no acute distress.  Voicing well with PMV.  Head and face:  Normocephalic.  No facial lesions.  + right TMJ TTP  Ears: Externally normal with grossly normal hearing.  Right ear-auricle is normally developed but exquisitely tender to palpation.  Tenderness to palpation along the mastoid.  EAC clear but sensitive. TM without obvious perforation but there is an area that is thin at the superior posterior area just above the graft.  It also appears to have a small perforation in the inferior anterior quadrant  Left ear-auricle is normally developed.  External auditory canal is clear.  Tympanic membrane is nonerythematous.   Nose:  Nasal dorsum unremarkable.  Mild congestion with clear drainage.    Neck:  Supple without adenopathy or thyromegaly.  Trachea is in the midline.    Eyes:  Extraocular muscles intact.  No nystagmus.  No exophthalmos or enophthalmos.  Neurologic:  Alert and oriented.  Cranial nerves 2-12 are grossly normal.    Audiogram:          Recent data from [place] :         Assessment/Plan:  53 y.o. with longstanding right tympanic membrane perforation associated with a mixed hearing loss with a 20-50 decibel air bone gap without history of recurrent otorrhea.  S/p right endoscopic tplasty with tragal cartilage graft on 11/20/23. She has an audible TM perforation and mastoid tenderness.   CT IAC shows fluid in the mastoid and thickened TM, no evidence scutum erosion concerning for cholesteatoma.  Pain seems out of proportion to physical exam and CT findings.  Her findings may be due to tmj tenderness and muscle pain     - we will refer her to a dentist who specializes in TMJ  - flonase 2 sprays BID  - Flexeril for neck pain  - Continue dry ear precautions  - return to clinic in 2 months    JENNIFER Cortes MD  [facility] Department of  Otolaryngology  HO-IV

## 2024-11-19 PROBLEM — F41.9 ANXIETY DISORDER: Status: ACTIVE | Noted: 2023-09-15

## 2024-11-19 PROBLEM — Z93.0 TRACHEOSTOMY PRESENT: Status: ACTIVE | Noted: 2024-11-19

## 2024-11-19 PROBLEM — J38.00 VOCAL CORD PARALYSIS: Status: ACTIVE | Noted: 2023-08-24

## 2024-11-19 PROBLEM — F32.A CHRONIC DEPRESSION: Status: ACTIVE | Noted: 2023-09-15

## 2024-11-19 PROBLEM — F33.2 SEVERE RECURRENT MAJOR DEPRESSION WITHOUT PSYCHOTIC FEATURES: Status: ACTIVE | Noted: 2024-03-15

## 2024-12-02 PROBLEM — M51.369 DEGENERATIVE DISC DISEASE, LUMBAR: Status: ACTIVE | Noted: 2024-12-02

## 2024-12-31 PROBLEM — M51.26 HNP (HERNIATED NUCLEUS PULPOSUS), LUMBAR: Status: ACTIVE | Noted: 2024-12-31

## 2025-03-22 PROCEDURE — 98005 SYNCH AUDIO-VIDEO EST LOW 20: CPT | Mod: 95,,, | Performed by: FAMILY MEDICINE

## (undated) DEVICE — GLOVE SENSICARE PI GRN 6

## (undated) DEVICE — DRESSING POLYSKIN II 2X2.75IN

## (undated) DEVICE — TRAY SKIN SCRUB WET PREMIUM

## (undated) DEVICE — NDL ECLIPSE SAFETY 18GX1-1/2IN

## (undated) DEVICE — CATH IV PROT 14GX11/4

## (undated) DEVICE — BLADE SCALP BEAVER 2.5MM ANG

## (undated) DEVICE — PROTECTOR ONE-STEP ARM REG

## (undated) DEVICE — Device

## (undated) DEVICE — PROBE SIMULATOR KRAFF

## (undated) DEVICE — KIT ANTIFOG W/SPONG & FLUID

## (undated) DEVICE — DRAPE SURG W/TWL 17 5/8X23

## (undated) DEVICE — ELECTRODE EMG NEEDLE

## (undated) DEVICE — GOWN POLY REINF BRTH SLV XL

## (undated) DEVICE — STAPLER SKIN ROTATING HEAD

## (undated) DEVICE — SPONGE SURGIFOAM 100 8.5X12X10

## (undated) DEVICE — DRESSING SURGICAL 1/2X1/2

## (undated) DEVICE — DRAPE INCISE IOBAN 2 23X17IN

## (undated) DEVICE — CORD BIPOLAR 12 FOOT

## (undated) DEVICE — GLOVE SIGNATURE MICRO LTX 7

## (undated) DEVICE — SYR 10CC LUER LOCK

## (undated) DEVICE — COUNTER NDL FOAM MAGNET 40/70

## (undated) DEVICE — DRAPE INSTR MAGNETIC 10X16IN

## (undated) DEVICE — GLOVE SIGNATURE MICRO LTX 6.5

## (undated) DEVICE — MANIFOLD 4 PORT

## (undated) DEVICE — SUT 5/0 18IN PLAIN FAST AB

## (undated) DEVICE — NDL 27G X 1 1/4

## (undated) DEVICE — DRAPE LEGGINGS CUFF 31X48IN

## (undated) DEVICE — SOL NORMAL USPCA 0.9%

## (undated) DEVICE — SOLIDIFIER BOTTLE 1500CC

## (undated) DEVICE — MARKER WRITESITE SKIN CHLRAPRP

## (undated) DEVICE — KIT SURGICAL TURNOVER

## (undated) DEVICE — COVER PROXIMA MAYO STAND

## (undated) DEVICE — ADHESIVE MASTISOL VIAL 48/BX